# Patient Record
Sex: FEMALE | Race: WHITE | NOT HISPANIC OR LATINO | Employment: PART TIME | URBAN - NONMETROPOLITAN AREA
[De-identification: names, ages, dates, MRNs, and addresses within clinical notes are randomized per-mention and may not be internally consistent; named-entity substitution may affect disease eponyms.]

---

## 2023-04-06 ENCOUNTER — HOSPITAL ENCOUNTER (EMERGENCY)
Facility: HOSPITAL | Age: 43
Discharge: HOME | End: 2023-04-06
Attending: STUDENT IN AN ORGANIZED HEALTH CARE EDUCATION/TRAINING PROGRAM | Admitting: STUDENT IN AN ORGANIZED HEALTH CARE EDUCATION/TRAINING PROGRAM
Payer: COMMERCIAL

## 2023-04-06 ENCOUNTER — APPOINTMENT (OUTPATIENT)
Dept: GENERAL RADIOLOGY | Facility: HOSPITAL | Age: 43
End: 2023-04-06
Payer: COMMERCIAL

## 2023-04-06 ENCOUNTER — APPOINTMENT (OUTPATIENT)
Dept: CT IMAGING | Facility: HOSPITAL | Age: 43
End: 2023-04-06
Payer: COMMERCIAL

## 2023-04-06 VITALS
HEART RATE: 76 BPM | HEIGHT: 66 IN | OXYGEN SATURATION: 97 % | DIASTOLIC BLOOD PRESSURE: 84 MMHG | WEIGHT: 180 LBS | SYSTOLIC BLOOD PRESSURE: 173 MMHG | TEMPERATURE: 97.5 F | RESPIRATION RATE: 22 BRPM | BODY MASS INDEX: 28.93 KG/M2

## 2023-04-06 DIAGNOSIS — R06.00 DYSPNEA, UNSPECIFIED TYPE: ICD-10-CM

## 2023-04-06 DIAGNOSIS — M54.2 NECK PAIN ON LEFT SIDE: Primary | ICD-10-CM

## 2023-04-06 DIAGNOSIS — R91.8 MULTIPLE LUNG NODULES: ICD-10-CM

## 2023-04-06 PROBLEM — Z33.1 PREGNANT STATE, INCIDENTAL: Status: ACTIVE | Noted: 2023-04-06

## 2023-04-06 LAB
ALBUMIN SERPL-MCNC: 4 G/DL (ref 3.5–5.2)
ALBUMIN/GLOB SERPL: 1.6 G/DL
ALP SERPL-CCNC: 99 U/L (ref 39–117)
ALT SERPL W P-5'-P-CCNC: 24 U/L (ref 1–33)
ANION GAP SERPL CALCULATED.3IONS-SCNC: 11 MMOL/L (ref 5–15)
AST SERPL-CCNC: 19 U/L (ref 1–32)
BILIRUB SERPL-MCNC: 0.2 MG/DL (ref 0–1.2)
BILIRUB UR QL STRIP: NEGATIVE
BUN SERPL-MCNC: 9 MG/DL (ref 6–20)
BUN/CREAT SERPL: 8.2 (ref 7–25)
CALCIUM SPEC-SCNC: 8.8 MG/DL (ref 8.6–10.5)
CHLORIDE SERPL-SCNC: 104 MMOL/L (ref 98–107)
CLARITY UR: CLEAR
CO2 SERPL-SCNC: 26 MMOL/L (ref 22–29)
COLOR UR: YELLOW
CREAT SERPL-MCNC: 1.1 MG/DL (ref 0.57–1)
D-DIMER, QUANTITATIVE (MAD,POW, STR): 530 NG/ML (FEU) (ref 0–500)
EGFRCR SERPLBLD CKD-EPI 2021: 64.1 ML/MIN/1.73
GEN 5 2HR TROPONIN T REFLEX: <6 NG/L
GLOBULIN UR ELPH-MCNC: 2.5 GM/DL
GLUCOSE SERPL-MCNC: 89 MG/DL (ref 65–99)
GLUCOSE UR STRIP-MCNC: NEGATIVE MG/DL
HGB UR QL STRIP.AUTO: NEGATIVE
HOLD SPECIMEN: NORMAL
HOLD SPECIMEN: NORMAL
KETONES UR QL STRIP: NEGATIVE
LEUKOCYTE ESTERASE UR QL STRIP.AUTO: NEGATIVE
LIPASE SERPL-CCNC: 40 U/L (ref 13–60)
NITRITE UR QL STRIP: NEGATIVE
NT-PROBNP SERPL-MCNC: 48.7 PG/ML (ref 0–450)
PH UR STRIP.AUTO: 5.5 [PH] (ref 5–9)
POTASSIUM SERPL-SCNC: 3.7 MMOL/L (ref 3.5–5.2)
PROT SERPL-MCNC: 6.5 G/DL (ref 6–8.5)
PROT UR QL STRIP: NEGATIVE
SODIUM SERPL-SCNC: 141 MMOL/L (ref 136–145)
SP GR UR STRIP: 1.03 (ref 1–1.03)
TROPONIN T DELTA: NORMAL
TROPONIN T SERPL HS-MCNC: <6 NG/L
UROBILINOGEN UR QL STRIP: ABNORMAL
WHOLE BLOOD HOLD COAG: NORMAL
WHOLE BLOOD HOLD SPECIMEN: NORMAL

## 2023-04-06 PROCEDURE — 84484 ASSAY OF TROPONIN QUANT: CPT

## 2023-04-06 PROCEDURE — 25010000002 METHYLPREDNISOLONE PER 125 MG: Performed by: NURSE PRACTITIONER

## 2023-04-06 PROCEDURE — 71045 X-RAY EXAM CHEST 1 VIEW: CPT

## 2023-04-06 PROCEDURE — 96375 TX/PRO/DX INJ NEW DRUG ADDON: CPT

## 2023-04-06 PROCEDURE — 85379 FIBRIN DEGRADATION QUANT: CPT | Performed by: NURSE PRACTITIONER

## 2023-04-06 PROCEDURE — 36415 COLL VENOUS BLD VENIPUNCTURE: CPT

## 2023-04-06 PROCEDURE — 83690 ASSAY OF LIPASE: CPT | Performed by: NURSE PRACTITIONER

## 2023-04-06 PROCEDURE — 99284 EMERGENCY DEPT VISIT MOD MDM: CPT

## 2023-04-06 PROCEDURE — 71275 CT ANGIOGRAPHY CHEST: CPT

## 2023-04-06 PROCEDURE — 25510000001 IOPAMIDOL PER 1 ML: Performed by: STUDENT IN AN ORGANIZED HEALTH CARE EDUCATION/TRAINING PROGRAM

## 2023-04-06 PROCEDURE — 80053 COMPREHEN METABOLIC PANEL: CPT

## 2023-04-06 PROCEDURE — 93005 ELECTROCARDIOGRAM TRACING: CPT

## 2023-04-06 PROCEDURE — 25010000002 KETOROLAC TROMETHAMINE PER 15 MG: Performed by: NURSE PRACTITIONER

## 2023-04-06 PROCEDURE — 96374 THER/PROPH/DIAG INJ IV PUSH: CPT

## 2023-04-06 PROCEDURE — 85025 COMPLETE CBC W/AUTO DIFF WBC: CPT

## 2023-04-06 PROCEDURE — 81003 URINALYSIS AUTO W/O SCOPE: CPT | Performed by: NURSE PRACTITIONER

## 2023-04-06 PROCEDURE — 83880 ASSAY OF NATRIURETIC PEPTIDE: CPT

## 2023-04-06 PROCEDURE — 72125 CT NECK SPINE W/O DYE: CPT

## 2023-04-06 RX ORDER — METHYLPREDNISOLONE SODIUM SUCCINATE 125 MG/2ML
125 INJECTION, POWDER, LYOPHILIZED, FOR SOLUTION INTRAMUSCULAR; INTRAVENOUS ONCE
Status: COMPLETED | OUTPATIENT
Start: 2023-04-06 | End: 2023-04-06

## 2023-04-06 RX ORDER — CYCLOBENZAPRINE HCL 5 MG
5 TABLET ORAL ONCE
Status: COMPLETED | OUTPATIENT
Start: 2023-04-06 | End: 2023-04-06

## 2023-04-06 RX ORDER — SODIUM CHLORIDE 0.9 % (FLUSH) 0.9 %
10 SYRINGE (ML) INJECTION AS NEEDED
Status: DISCONTINUED | OUTPATIENT
Start: 2023-04-06 | End: 2023-04-07 | Stop reason: HOSPADM

## 2023-04-06 RX ORDER — CYCLOBENZAPRINE HCL 5 MG
5 TABLET ORAL 3 TIMES DAILY PRN
Qty: 15 TABLET | Refills: 0 | Status: SHIPPED | OUTPATIENT
Start: 2023-04-06

## 2023-04-06 RX ORDER — HYDROCODONE BITARTRATE AND ACETAMINOPHEN 5; 325 MG/1; MG/1
1 TABLET ORAL ONCE
Status: COMPLETED | OUTPATIENT
Start: 2023-04-06 | End: 2023-04-06

## 2023-04-06 RX ORDER — KETOROLAC TROMETHAMINE 30 MG/ML
30 INJECTION, SOLUTION INTRAMUSCULAR; INTRAVENOUS ONCE
Status: COMPLETED | OUTPATIENT
Start: 2023-04-06 | End: 2023-04-06

## 2023-04-06 RX ADMIN — METHYLPREDNISOLONE SODIUM SUCCINATE 125 MG: 125 INJECTION, POWDER, FOR SOLUTION INTRAMUSCULAR; INTRAVENOUS at 21:47

## 2023-04-06 RX ADMIN — CYCLOBENZAPRINE 5 MG: 5 TABLET, FILM COATED ORAL at 18:44

## 2023-04-06 RX ADMIN — KETOROLAC TROMETHAMINE 30 MG: 30 INJECTION, SOLUTION INTRAMUSCULAR; INTRAVENOUS at 18:44

## 2023-04-06 RX ADMIN — SODIUM CHLORIDE 1000 ML: 9 INJECTION, SOLUTION INTRAVENOUS at 18:43

## 2023-04-06 RX ADMIN — IOPAMIDOL 59 ML: 755 INJECTION, SOLUTION INTRAVENOUS at 19:38

## 2023-04-06 RX ADMIN — HYDROCODONE BITARTRATE AND ACETAMINOPHEN 1 TABLET: 5; 325 TABLET ORAL at 21:47

## 2023-04-06 NOTE — Clinical Note
[facility] EMERGENCY DEPARTMENT  [address] [place] [id number]  Phone: [phone]    Roma Lazaro was seen and treated in our emergency department on 4/6/2023.  She may return to work on 04/08/2023.         Thank you for choosing [facility].    Cristal Rebolledo APRN

## 2023-04-07 LAB
BASOPHILS # BLD AUTO: NORMAL 10*3/UL
BASOPHILS NFR BLD AUTO: NORMAL %
DEPRECATED RDW RBC AUTO: NORMAL FL
EOSINOPHIL # BLD AUTO: NORMAL 10*3/UL
EOSINOPHIL NFR BLD AUTO: NORMAL %
ERYTHROCYTE [DISTWIDTH] IN BLOOD BY AUTOMATED COUNT: NORMAL %
HCT VFR BLD AUTO: NORMAL %
HGB BLD-MCNC: NORMAL G/DL
LYMPHOCYTES # BLD AUTO: NORMAL 10*3/UL
LYMPHOCYTES NFR BLD AUTO: NORMAL %
MCH RBC QN AUTO: NORMAL PG
MCHC RBC AUTO-ENTMCNC: NORMAL G/DL
MCV RBC AUTO: NORMAL FL
MONOCYTES # BLD AUTO: NORMAL 10*3/UL
MONOCYTES NFR BLD AUTO: NORMAL %
NEUTROPHILS NFR BLD AUTO: NORMAL %
NEUTROPHILS NFR BLD AUTO: NORMAL %
PLATELET # BLD AUTO: NORMAL 10*3/UL
PMV BLD AUTO: NORMAL FL
RBC # BLD AUTO: NORMAL 10*6/UL
WBC NRBC COR # BLD: NORMAL 10*3/UL

## 2023-04-07 NOTE — ED NOTES
Pt requested that her IV be taken out because she was ready to leave, this tech removed IV and patient eloped.

## 2023-04-07 NOTE — ED PROVIDER NOTES
Subjective   History of Present Illness  Patient presents to the ER with c/o pain to her left neck that radiates down to between her shoulder blades, chest pain midsternal that worsens with breathing, shortness of breath. Pain to chest improves with apply pressure to mid chest. She states moving her arms worsens her pain but she does not have pain to her arm. Medications include ibuprofen at 0900 with minimal improvement of pain. Patient report chronic smokers cough with no change in cough presentation. Denies abdominal pain or N/V/D.         Review of Systems   Constitutional: Negative for chills, fatigue and fever.   HENT: Negative.    Respiratory: Positive for shortness of breath.    Cardiovascular: Positive for chest pain. Negative for palpitations.   Gastrointestinal: Negative for abdominal pain, nausea and vomiting.   Genitourinary: Negative.    Musculoskeletal: Positive for back pain and neck pain.   Skin: Negative.    Neurological: Negative.    Psychiatric/Behavioral: Negative.        Past Medical History:   Diagnosis Date   • Acute bronchitis    • Acute otitis media    • Acute sinusitis     nasal congestion      • Bacterial conjunctivitis    • Cellulitis and abscess of leg    • Conjunctivitis    • Cough    • Pediculosis capitis    • Pregnant state, incidental    • Tobacco dependence syndrome    • Upper respiratory infection    • Wheezing        Allergies   Allergen Reactions   • Other      MRSA HX       Past Surgical History:   Procedure Laterality Date   • INCISION AND DRAINAGE ABSCESS  10/14/2010   • INJECTION OF MEDICATION  01/26/2015    Kenalog (1)          History reviewed. No pertinent family history.    Social History     Socioeconomic History   • Marital status:    Tobacco Use   • Smoking status: Every Day   Substance and Sexual Activity   • Alcohol use: No   • Drug use: Yes     Types: Marijuana     Comment: Smoked this morning approx 1000   • Sexual activity: Yes           Objective    BP  "141/86 (BP Location: Left arm, Patient Position: Sitting)   Pulse 84   Temp 97.5 °F (36.4 °C) (Oral)   Resp 22   Ht 167.6 cm (66\")   Wt 81.6 kg (180 lb)   LMP  (LMP Unknown)   SpO2 95%   BMI 29.05 kg/m²     Physical Exam  Vitals and nursing note reviewed.   Constitutional:       Appearance: She is not ill-appearing.   HENT:      Head: Normocephalic and atraumatic.   Neck:     Cardiovascular:      Rate and Rhythm: Normal rate and regular rhythm.      Heart sounds: Normal heart sounds.   Pulmonary:      Effort: Pulmonary effort is normal.      Breath sounds: No decreased breath sounds.   Chest:      Chest wall: No tenderness.   Abdominal:      General: Bowel sounds are normal.      Palpations: Abdomen is soft.      Tenderness: There is no abdominal tenderness.   Musculoskeletal:         General: Normal range of motion.   Skin:     General: Skin is warm and dry.      Capillary Refill: Capillary refill takes less than 2 seconds.   Neurological:      Mental Status: She is alert and oriented to person, place, and time.   Psychiatric:         Mood and Affect: Mood normal.         Behavior: Behavior normal.         Procedures  Results for orders placed or performed during the hospital encounter of 04/06/23   High Sensitivity Troponin T    Specimen: Blood   Result Value Ref Range    HS Troponin T <6 <10 ng/L   Comprehensive Metabolic Panel    Specimen: Blood   Result Value Ref Range    Glucose 89 65 - 99 mg/dL    BUN 9 6 - 20 mg/dL    Creatinine 1.10 (H) 0.57 - 1.00 mg/dL    Sodium 141 136 - 145 mmol/L    Potassium 3.7 3.5 - 5.2 mmol/L    Chloride 104 98 - 107 mmol/L    CO2 26.0 22.0 - 29.0 mmol/L    Calcium 8.8 8.6 - 10.5 mg/dL    Total Protein 6.5 6.0 - 8.5 g/dL    Albumin 4.0 3.5 - 5.2 g/dL    ALT (SGPT) 24 1 - 33 U/L    AST (SGOT) 19 1 - 32 U/L    Alkaline Phosphatase 99 39 - 117 U/L    Total Bilirubin 0.2 0.0 - 1.2 mg/dL    Globulin 2.5 gm/dL    A/G Ratio 1.6 g/dL    BUN/Creatinine Ratio 8.2 7.0 - 25.0    Anion " Gap 11.0 5.0 - 15.0 mmol/L    eGFR 64.1 >60.0 mL/min/1.73   BNP    Specimen: Blood   Result Value Ref Range    proBNP 48.7 0.0 - 450.0 pg/mL   Lipase    Specimen: Blood   Result Value Ref Range    Lipase 40 13 - 60 U/L   D-dimer, Quantitative    Specimen: Blood   Result Value Ref Range    D-Dimer, Quantitative 530 (H) 0 - 500 ng/mL (FEU)   High Sensitivity Troponin T 2Hr    Specimen: Arm, Left; Blood   Result Value Ref Range    HS Troponin T <6 <10 ng/L    Troponin T Delta     ECG 12 Lead ED Triage Standing Order; Chest Pain   Result Value Ref Range    QT Interval 380 ms    QTC Interval 443 ms   Green Top (Gel)   Result Value Ref Range    Extra Tube Hold for add-ons.    Lavender Top   Result Value Ref Range    Extra Tube hold for add-on    Gold Top - SST   Result Value Ref Range    Extra Tube Hold for add-ons.    Light Blue Top   Result Value Ref Range    Extra Tube Hold for add-ons.      CT Cervical Spine Without Contrast    Result Date: 4/6/2023  Narrative: TECHNIQUE: Axial images from the base of the skull through the thoracic inlet were performed followed by 2D multiplanar reformats. FINDINGS: Straightening of the normal cervical lordosis.  There is no fracture. No malalignment. Severe degenerative disc disease at the C5-6 and C6-7 levels.  Small posterior disc osteophyte complex at these levels without significant narrowing of the bony spinal canal.     Impression: 1.  No acute abnormalities. 2.  Degenerative changes as detailed above.    XR Chest 1 View    Result Date: 4/6/2023  Narrative: Comparison: None FINDINGS: Subtle tree-in-bud nodular opacities at the bilateral lung bases may represent pneumonitis.  No pleural effusion or pneumothorax.  Cardiomediastinal silhouette is unremarkable.     CT Angiogram Chest    Result Date: 4/6/2023  Narrative: TECHNIQUE: IV contrast was administered and axial images from the thoracic inlet through the diaphragms were performed.  3D multiplanar reformats of the thoracic  aorta were completed on a separate workstation under concurrent supervision. FINDINGS: Osseous structures are age-appropriate.  Limited views of the upper abdomen demonstrate diffuse fatty infiltration of the liver. Heart size is within normal limits.  No intrathoracic lymphadenopathy.  No pulmonary embolus. There is moderate centrilobular emphysema.  4 mm pulmonary nodule right upper lobe image 49 series 3.  Small subpleural nodule measuring 3 mm right upper lobe image 61 series 3.  Groundglass opacity right upper lobe measuring 7 mm image 36 series 3.     Impression: 1.  No pulmonary embolus. 2.  Several pulmonary nodules as detailed above in this patient with emphysema. Follow-up CT of the chest in 3-6 months is recommended. 3.  Hepatomegaly and diffuse fatty infiltration of the liver.             ED Course  ED Course as of 04/06/23 2141   Thu Apr 06, 2023 2045 Patient states medication has helped some but not completely resolved the pain in her neck and back.  [SH]   2109 Work up reviewed with patient. Encouraged patient to stop smoking at this time. Aware of need for repeat CT. Will repeat Trop and EKG and if normal discharge home. Will provide pain medication and steroids at this time.  [SH]      ED Course User Index  [SH] Cristal Rebolledo, JULIETTE                                           Medical Decision Making  Patient presents to the ER with c/o left sided neck pain that radiates down in to her back between her shoulder blades. She states the pain radiates into her upper chest and she has pain when breathing. Work up shows negative Trop x2 with NSR on EKG. CTA negative for PE. Pain has improved with use of medications. Discussed D/C home with treatment and advised on when to return back to the ER . Advised to follow up with PCP for reevaluation and repeat CT in 3-6 months.     Dyspnea, unspecified type: complicated acute illness or injury  Multiple lung nodules: complicated acute illness or  injury  Neck pain on left side: complicated acute illness or injury  Amount and/or Complexity of Data Reviewed  Radiology: ordered.  ECG/medicine tests: ordered.      Risk  Prescription drug management.          Final diagnoses:   Neck pain on left side   Multiple lung nodules   Dyspnea, unspecified type       ED Disposition  ED Disposition     ED Disposition   Discharge    Condition   Stable    Comment   --             OMEGA  RESIDENT [facility]  [address]   [place] [address] [phone]  Schedule an appointment as soon as possible for a visit   ER follow up         Medication List      New Prescriptions    cyclobenzaprine 5 MG tablet  Commonly known as: FLEXERIL  Take 1 tablet by mouth 3 (Three) Times a Day As Needed for Muscle Spasms.        Changed    diclofenac 50 MG EC tablet  Commonly known as: VOLTAREN  Take 1 tablet by mouth 3 (Three) Times a Day As Needed (pain).  What changed:   · when to take this  · reasons to take this        Stop    promethazine-dextromethorphan 6.25-15 MG/5ML syrup  Commonly known as: PROMETHAZINE-DM           Where to Get Your Medications      These medications were sent to [facility]/pharmacy #[id number] - [place], [place] - [address] [place] [address] - [phone]  - [phone] [address] [address]    Phone: [phone]   · cyclobenzaprine 5 MG tablet  · diclofenac 50 MG EC tablet          Cristal Rebolledo, JULIETTE  04/06/23 4312

## 2023-04-07 NOTE — DISCHARGE INSTRUCTIONS
Fill your prescriptions and take as directed. Work excuse provided. Follow up with the [facility] or a primary care provider of your choice early next week for reevaluation - call tomorrow for appointment. Return back to the ER over the weekend if your symptoms worsen or change in presentation. You CT scan shows you have emphysema and some small lung nodules in your right lung. Stop smoking. It is recommended you have a repeat CT scan in 3-6 months for reevaluation - this will need to be set up by your PCP.

## 2023-04-13 LAB
QT INTERVAL: 380 MS
QTC INTERVAL: 443 MS

## 2023-05-08 ENCOUNTER — OFFICE VISIT (OUTPATIENT)
Dept: FAMILY MEDICINE CLINIC | Facility: CLINIC | Age: 43
End: 2023-05-08
Payer: COMMERCIAL

## 2023-05-08 VITALS
OXYGEN SATURATION: 99 % | WEIGHT: 190 LBS | DIASTOLIC BLOOD PRESSURE: 78 MMHG | HEART RATE: 98 BPM | SYSTOLIC BLOOD PRESSURE: 134 MMHG | HEIGHT: 66 IN | BODY MASS INDEX: 30.53 KG/M2

## 2023-05-08 DIAGNOSIS — Z11.59 NEED FOR HEPATITIS C SCREENING TEST: ICD-10-CM

## 2023-05-08 DIAGNOSIS — Z13.29 SCREENING FOR THYROID DISORDER: ICD-10-CM

## 2023-05-08 DIAGNOSIS — R23.2 HOT FLASHES: ICD-10-CM

## 2023-05-08 DIAGNOSIS — F17.210 CIGARETTE NICOTINE DEPENDENCE WITHOUT COMPLICATION: ICD-10-CM

## 2023-05-08 DIAGNOSIS — Z76.89 ENCOUNTER TO ESTABLISH CARE: ICD-10-CM

## 2023-05-08 DIAGNOSIS — Z13.220 SCREENING FOR HYPERCHOLESTEROLEMIA: ICD-10-CM

## 2023-05-08 DIAGNOSIS — R06.02 SHORTNESS OF BREATH: Primary | ICD-10-CM

## 2023-05-08 PROCEDURE — 99214 OFFICE O/P EST MOD 30 MIN: CPT | Performed by: NURSE PRACTITIONER

## 2023-05-08 PROCEDURE — 1160F RVW MEDS BY RX/DR IN RCRD: CPT | Performed by: NURSE PRACTITIONER

## 2023-05-08 PROCEDURE — 1159F MED LIST DOCD IN RCRD: CPT | Performed by: NURSE PRACTITIONER

## 2023-05-08 RX ORDER — BUPROPION HYDROCHLORIDE 150 MG/1
150 TABLET ORAL DAILY
Qty: 7 TABLET | Refills: 0 | Status: SHIPPED | OUTPATIENT
Start: 2023-05-08 | End: 2023-05-11 | Stop reason: SDUPTHER

## 2023-05-08 NOTE — PROGRESS NOTES
"Chief Complaint  Establish Care (New PT )    Subjective  Encounter to establish care.  Has been complaining of shortness of breath and neck pain.  Was evaluated at [facility] emergency department on April 26 and chest CT at that time showed multiple pulmonary nodules.  \"I guess I am not in as good shape as I thought I was.  This is really made me scared.  I have also been having hot flashes and night sweats.\"        Roma Lazaro presents to [facility] [place]  Shortness of Breath  This is a chronic problem. The current episode started more than 1 month ago. The problem occurs intermittently. The problem has been waxing and waning. Pertinent negatives include no chest pain or leg swelling. The symptoms are aggravated by smoke. She has tried nothing for the symptoms. The treatment provided no relief.   Night Sweats  This is a chronic problem. The current episode started more than 1 year ago. The problem occurs every several days. The problem has been waxing and waning. Pertinent negatives include no chest pain. Nothing aggravates the symptoms. She has tried nothing for the symptoms. The treatment provided no relief.   Nicotine Dependence  Presents for initial visit. Symptoms include cravings and irritability. Preferred tobacco types include cigarettes. Preferred cigarette types include filtered. Preferred strength is regular. Preferred cigarettes are menthol. Cigarette brand: MonteBioptigen  Her urge triggers include company of smokers, driving and meal time. Her first smoke is before 6 AM. She smokes 1 pack of cigarettes per day. She started smoking when she was 15-17 years old. Past treatments include nothing. Roma is ready to quit. Roma has tried to quit 0 times.     Current Outpatient Medications on File Prior to Visit   Medication Sig Dispense Refill   • [DISCONTINUED] cyclobenzaprine (FLEXERIL) 5 MG tablet Take 1 tablet by mouth " "3 (Three) Times a Day As Needed for Muscle Spasms. 15 tablet 0   • [DISCONTINUED] diclofenac (VOLTAREN) 50 MG EC tablet Take 1 tablet by mouth 3 (Three) Times a Day As Needed (pain). 30 tablet 0     No current facility-administered medications on file prior to visit.     Allergies   Allergen Reactions   • Other      MRSA HX       Objective   Vital Signs:  /78   Pulse 98   Ht 167.6 cm (66\")   Wt 86.2 kg (190 lb)   SpO2 99%   BMI 30.67 kg/m²   Estimated body mass index is 30.67 kg/m² as calculated from the following:    Height as of this encounter: 167.6 cm (66\").    Weight as of this encounter: 86.2 kg (190 lb).       BMI is >= 30 and <35. (Class 1 Obesity). The following options were offered after discussion;: exercise counseling/recommendations and nutrition counseling/recommendations      Physical Exam  Vitals and nursing note reviewed.   Constitutional:       Appearance: Normal appearance. She is well-developed. She is obese.   HENT:      Head: Normocephalic and atraumatic.      Right Ear: Tympanic membrane, ear canal and external ear normal.      Left Ear: Tympanic membrane, ear canal and external ear normal.      Nose: Nose normal.      Mouth/Throat:      Mouth: Mucous membranes are moist.      Pharynx: Oropharynx is clear.   Eyes:      Extraocular Movements: Extraocular movements intact.      Conjunctiva/sclera: Conjunctivae normal.      Pupils: Pupils are equal, round, and reactive to light.   Cardiovascular:      Rate and Rhythm: Normal rate and regular rhythm.      Pulses: Normal pulses.      Heart sounds: Normal heart sounds.   Pulmonary:      Effort: Pulmonary effort is normal.      Breath sounds: Normal breath sounds.   Abdominal:      General: Bowel sounds are normal.      Palpations: Abdomen is soft.   Musculoskeletal:         General: Normal range of motion.      Cervical back: Normal range of motion and neck supple.   Skin:     General: Skin is warm.      Capillary Refill: Capillary refill " takes less than 2 seconds.   Neurological:      Mental Status: She is alert and oriented to person, place, and time.   Psychiatric:         Behavior: Behavior normal.        Result Review :                   Assessment and Plan   Diagnoses and all orders for this visit:    1. Shortness of breath (Primary)  -     CBC & Differential; Future  -     Comprehensive Metabolic Panel; Future  -     Full Pulmonary Function Test With Bronchodilator; Future    2. Hot flashes  -     Estradiol; Future  -     Testosterone; Future    3. Cigarette nicotine dependence without complication  -     buPROPion XL (Wellbutrin XL) 150 MG 24 hr tablet; Take 1 tablet by mouth Daily.  Dispense: 7 tablet; Refill: 0    4. Screening for hypercholesterolemia  -     Lipid panel; Future    5. Screening for thyroid disorder  -     TSH; Future    6. Need for hepatitis C screening test  -     Hepatitis C antibody; Future    7. Encounter to establish care      1.  Shortness of breath:  Complete CBC and chemistry panel as ordered and will notify of results when available  Pulmonology will call to schedule PFTs and will notify of results when available  Discussed results of most recent chest CT with patient and informed we will repeat chest CT at next appointment in 3 months  Strongly encourage smoking cessation    2.  Hot flashes:  Complete estradiol testosterone level as ordered and will notify of results when available    3.  Cigarette nicotine dependence without complication:  Roma Lazaro  reports that she has been smoking. She does not have any smokeless tobacco history on file.. I have educated her on the risk of diseases from using tobacco products such as cancer, COPD and heart disease.   I advised her to quit and she is willing to quit. We have discussed the following method/s for tobacco cessation:  Prescription Medicaiton.  Together we have set a quit date for 2 weeks from today.  She will follow up with me in 3 months or sooner to  check on her progress.  I spent 4.5 minutes counseling the patient.  Begin Wellbutrin as prescribed  Educated on possible side effects of this medication including not limited to increased risk for weight changes and worsening of depression    4.  Screening for hypercholesterolemia:  Complete fasting lipid panel as ordered will notify of results when available     5   Screening for thyroid disorder:  Complete TSH as ordered and will notify of results when available    6.  Need for hepatitis C screening test:  Complete hepatitis C antibody as ordered and will notify of results when available    7.  Encounter to establish care:       Follow Up   Return in about 3 months (around 8/8/2023) for Annual physical.  Patient was given instructions and counseling regarding her condition or for health maintenance advice. Please see specific information pulled into the AVS if appropriate.         This document has been electronically signed by JULIETTE Steele on May 8, 2023 13:06 CDT

## 2023-05-11 DIAGNOSIS — F17.210 CIGARETTE NICOTINE DEPENDENCE WITHOUT COMPLICATION: ICD-10-CM

## 2023-05-11 RX ORDER — BUPROPION HYDROCHLORIDE 150 MG/1
150 TABLET ORAL DAILY
Qty: 30 TABLET | Refills: 2 | Status: SHIPPED | OUTPATIENT
Start: 2023-05-11
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
## 2023-07-18 PROBLEM — M25.511 ACUTE PAIN OF RIGHT SHOULDER: Status: ACTIVE | Noted: 2023-07-18

## 2023-07-18 PROBLEM — M75.101 ROTATOR CUFF SYNDROME OF RIGHT SHOULDER: Status: ACTIVE | Noted: 2023-07-18

## 2023-08-02 ENCOUNTER — HOSPITAL ENCOUNTER (OUTPATIENT)
Dept: MRI IMAGING | Facility: HOSPITAL | Age: 43
Discharge: HOME | End: 2023-08-02
Admitting: ORTHOPAEDIC SURGERY
Payer: COMMERCIAL

## 2023-08-02 DIAGNOSIS — M25.511 ACUTE PAIN OF RIGHT SHOULDER: ICD-10-CM
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
## 2023-08-02 DIAGNOSIS — M75.101 ROTATOR CUFF SYNDROME OF RIGHT SHOULDER: ICD-10-CM

## 2023-08-02 PROCEDURE — 73221 MRI JOINT UPR EXTREM W/O DYE: CPT

## 2023-08-07 ENCOUNTER — LAB (OUTPATIENT)
Dept: LAB | Facility: HOSPITAL | Age: 43
End: 2023-08-07
Payer: COMMERCIAL

## 2023-08-07 DIAGNOSIS — Z13.29 SCREENING FOR THYROID DISORDER: ICD-10-CM

## 2023-08-07 DIAGNOSIS — R23.2 HOT FLASHES: ICD-10-CM

## 2023-08-07 DIAGNOSIS — Z13.220 SCREENING FOR HYPERCHOLESTEROLEMIA: ICD-10-CM

## 2023-08-07 DIAGNOSIS — Z11.59 NEED FOR HEPATITIS C SCREENING TEST: ICD-10-CM

## 2023-08-07 DIAGNOSIS — R06.02 SHORTNESS OF BREATH: ICD-10-CM

## 2023-08-07 LAB
ALBUMIN SERPL-MCNC: 4.1 G/DL (ref 3.5–5.2)
ALBUMIN/GLOB SERPL: 1.6 G/DL
ALP SERPL-CCNC: 95 U/L (ref 39–117)
ALT SERPL W P-5'-P-CCNC: 27 U/L (ref 1–33)
ANION GAP SERPL CALCULATED.3IONS-SCNC: 10.2 MMOL/L (ref 5–15)
AST SERPL-CCNC: 26 U/L (ref 1–32)
BASOPHILS # BLD AUTO: 0.08 10*3/MM3 (ref 0–0.2)
BASOPHILS NFR BLD AUTO: 0.9 % (ref 0–1.5)
BILIRUB SERPL-MCNC: 0.3 MG/DL (ref 0–1.2)
BUN SERPL-MCNC: 11 MG/DL (ref 6–20)
BUN/CREAT SERPL: 12.2 (ref 7–25)
CALCIUM SPEC-SCNC: 9.7 MG/DL (ref 8.6–10.5)
CHLORIDE SERPL-SCNC: 106 MMOL/L (ref 98–107)
CHOLEST SERPL-MCNC: 96 MG/DL (ref 0–200)
CO2 SERPL-SCNC: 24.8 MMOL/L (ref 22–29)
CREAT SERPL-MCNC: 0.9 MG/DL (ref 0.57–1)
DEPRECATED RDW RBC AUTO: 39.4 FL (ref 37–54)
EGFRCR SERPLBLD CKD-EPI 2021: 81.5 ML/MIN/1.73
EOSINOPHIL # BLD AUTO: 0.28 10*3/MM3 (ref 0–0.4)
EOSINOPHIL NFR BLD AUTO: 3.2 % (ref 0.3–6.2)
ERYTHROCYTE [DISTWIDTH] IN BLOOD BY AUTOMATED COUNT: 13.6 % (ref 12.3–15.4)
ESTRADIOL SERPL HS-MCNC: 15 PG/ML
GLOBULIN UR ELPH-MCNC: 2.5 GM/DL
GLUCOSE SERPL-MCNC: 98 MG/DL (ref 65–99)
HCT VFR BLD AUTO: 40.9 % (ref 34–46.6)
HCV AB SER DONR QL: NORMAL
HDLC SERPL-MCNC: 49 MG/DL (ref 40–60)
HGB BLD-MCNC: 14 G/DL (ref 12–15.9)
IMM GRANULOCYTES # BLD AUTO: 0.04 10*3/MM3 (ref 0–0.05)
IMM GRANULOCYTES NFR BLD AUTO: 0.5 % (ref 0–0.5)
LDLC SERPL CALC-MCNC: 35 MG/DL (ref 0–100)
LDLC/HDLC SERPL: 0.76 {RATIO}
LYMPHOCYTES # BLD AUTO: 2.62 10*3/MM3 (ref 0.7–3.1)
LYMPHOCYTES NFR BLD AUTO: 30.1 % (ref 19.6–45.3)
MCH RBC QN AUTO: 27.9 PG (ref 26.6–33)
MCHC RBC AUTO-ENTMCNC: 34.2 G/DL (ref 31.5–35.7)
MCV RBC AUTO: 81.6 FL (ref 79–97)
MONOCYTES # BLD AUTO: 0.7 10*3/MM3 (ref 0.1–0.9)
MONOCYTES NFR BLD AUTO: 8 % (ref 5–12)
NEUTROPHILS NFR BLD AUTO: 4.98 10*3/MM3 (ref 1.7–7)
NEUTROPHILS NFR BLD AUTO: 57.3 % (ref 42.7–76)
NRBC BLD AUTO-RTO: 0 /100 WBC (ref 0–0.2)
PLATELET # BLD AUTO: 288 10*3/MM3 (ref 140–450)
PMV BLD AUTO: 10.5 FL (ref 6–12)
POTASSIUM SERPL-SCNC: 5.1 MMOL/L (ref 3.5–5.2)
PROT SERPL-MCNC: 6.6 G/DL (ref 6–8.5)
RBC # BLD AUTO: 5.01 10*6/MM3 (ref 3.77–5.28)
SODIUM SERPL-SCNC: 141 MMOL/L (ref 136–145)
TESTOST SERPL-MCNC: 10.9 NG/DL (ref 8.4–48.1)
TRIGL SERPL-MCNC: 50 MG/DL (ref 0–150)
TSH SERPL DL<=0.05 MIU/L-ACNC: 1.7 UIU/ML (ref 0.27–4.2)
VLDLC SERPL-MCNC: 12 MG/DL (ref 5–40)
WBC NRBC COR # BLD: 8.7 10*3/MM3 (ref 3.4–10.8)

## 2023-08-07 PROCEDURE — 84443 ASSAY THYROID STIM HORMONE: CPT

## 2023-08-07 PROCEDURE — 36415 COLL VENOUS BLD VENIPUNCTURE: CPT

## 2023-08-07 PROCEDURE — 80061 LIPID PANEL: CPT

## 2023-08-07 PROCEDURE — 80053 COMPREHEN METABOLIC PANEL: CPT

## 2023-08-07 PROCEDURE — 85025 COMPLETE CBC W/AUTO DIFF WBC: CPT

## 2023-08-07 PROCEDURE — 82670 ASSAY OF TOTAL ESTRADIOL: CPT

## 2023-08-07 PROCEDURE — 84403 ASSAY OF TOTAL TESTOSTERONE: CPT

## 2023-08-07 PROCEDURE — 86803 HEPATITIS C AB TEST: CPT

## 2023-08-08 ENCOUNTER — TELEPHONE (OUTPATIENT)
Dept: FAMILY MEDICINE CLINIC | Facility: CLINIC | Age: 43
End: 2023-08-08
Payer: COMMERCIAL

## 2023-08-08 NOTE — PROGRESS NOTES
Per JULIETTE Espinosa, Ms. Lazaro has been called with recent lab results & recommendations.  Continue current medications and follow-up as planned or sooner if any problems.

## 2023-08-08 NOTE — TELEPHONE ENCOUNTER
Per JULIETTE Espinosa, Ms. Lazaro has been called with recent lab results & recommendations.  Continue current medications and follow-up as planned or sooner if any problems.     ----- Message from JULIETTE Steele sent at 8/8/2023  6:33 AM CDT -----  All labs including hormone levels were essentially normal.

## 2023-08-10 ENCOUNTER — OFFICE VISIT (OUTPATIENT)
Dept: FAMILY MEDICINE CLINIC | Facility: CLINIC | Age: 43
End: 2023-08-10
Payer: COMMERCIAL

## 2023-08-10 VITALS
HEIGHT: 66 IN | OXYGEN SATURATION: 96 % | HEART RATE: 90 BPM | DIASTOLIC BLOOD PRESSURE: 78 MMHG | SYSTOLIC BLOOD PRESSURE: 126 MMHG | WEIGHT: 195.9 LBS | BODY MASS INDEX: 31.48 KG/M2

## 2023-08-10 DIAGNOSIS — Z23 NEED FOR TDAP VACCINATION: ICD-10-CM

## 2023-08-10 DIAGNOSIS — S46.011D TRAUMATIC COMPLETE TEAR OF RIGHT ROTATOR CUFF, SUBSEQUENT ENCOUNTER: ICD-10-CM

## 2023-08-10 DIAGNOSIS — F41.9 ANXIETY: ICD-10-CM

## 2023-08-10 DIAGNOSIS — Z12.31 SCREENING MAMMOGRAM FOR BREAST CANCER: ICD-10-CM

## 2023-08-10 DIAGNOSIS — Z00.00 ANNUAL PHYSICAL EXAM: Primary | ICD-10-CM

## 2023-08-10 DIAGNOSIS — Z12.4 ENCOUNTER FOR SCREENING FOR CERVICAL CANCER: ICD-10-CM

## 2023-08-10 DIAGNOSIS — Z11.51 ENCOUNTER FOR SCREENING FOR HUMAN PAPILLOMAVIRUS (HPV): ICD-10-CM

## 2023-08-10 RX ORDER — BUPROPION HYDROCHLORIDE 300 MG/1
300 TABLET ORAL DAILY
Qty: 90 TABLET | Refills: 3 | Status: SHIPPED | OUTPATIENT
Start: 2023-08-10

## 2023-08-10 NOTE — PROGRESS NOTES
"Chief Complaint  Annual Exam    Subjective        Roma Lazaro presents to [facility] [place]  Annual physical exam.  On June 22, 2023 was evaluated at [facility] emergency department with right shoulder pain after falling at home.  Had MRI which showed massive rotator cuff tear.  Scheduled to follow-up with Dr. Hernandez, orthopedist, on August 15, 2023 to discuss possible surgical intervention.  Anxiety has been increasing \"because I never had surgery.  I really do not want to be cut on.\"    History of Present Illness  HPI:  Annual physical exam   Shoulder Injury   The incident occurred at home. The right shoulder is affected. The injury mechanism was a fall. The quality of the pain is described as aching and shooting. The pain radiates to the right arm. The pain is severe. The symptoms are aggravated by overhead lifting and movement. The treatment provided mild relief.   Anxiety  Presents for follow-up visit. The problem has been gradually worsening. Symptoms include decreased concentration, excessive worry, nervous/anxious behavior, panic and restlessness. Symptoms occur constantly. The severity of symptoms is moderate. The quality of sleep is fair. Nighttime awakenings: occasional.     Compliance with medications is %.     Current Outpatient Medications on File Prior to Visit   Medication Sig Dispense Refill    meloxicam (MOBIC) 15 MG tablet 1 PO Daily with food. 30 tablet 3    [DISCONTINUED] buPROPion XL (Wellbutrin XL) 150 MG 24 hr tablet Take 1 tablet by mouth Daily. 30 tablet 2     No current facility-administered medications on file prior to visit.     Allergies   Allergen Reactions    Other      MRSA HX       Objective   Vital Signs:  /78   Pulse 90   Ht 167.6 cm (66\")   Wt 88.9 kg (195 lb 14.4 oz)   SpO2 96%   BMI 31.62 kg/mý   Estimated body mass index is 31.62 kg/mý as calculated from the following:    " "Height as of this encounter: 167.6 cm (66\").    Weight as of this encounter: 88.9 kg (195 lb 14.4 oz).       Physical Exam  Vitals and nursing note reviewed. Exam conducted with a chaperone present.   Constitutional:       Appearance: Normal appearance. She is well-developed. She is obese.   HENT:      Head: Normocephalic and atraumatic.      Right Ear: Tympanic membrane, ear canal and external ear normal.      Left Ear: Tympanic membrane, ear canal and external ear normal.      Nose: Nose normal.      Mouth/Throat:      Mouth: Mucous membranes are moist.      Pharynx: Oropharynx is clear.   Eyes:      Extraocular Movements: Extraocular movements intact.      Conjunctiva/sclera: Conjunctivae normal.      Pupils: Pupils are equal, round, and reactive to light.   Cardiovascular:      Rate and Rhythm: Normal rate and regular rhythm.      Pulses: Normal pulses.      Heart sounds: Normal heart sounds.   Pulmonary:      Effort: Pulmonary effort is normal.      Breath sounds: Normal breath sounds.   Chest:      Comments: Bilateral, manual breast exam performed and no dimpling, puckering, masses or nodules palpated      Abdominal:      General: Bowel sounds are normal.      Palpations: Abdomen is soft.   Genitourinary:     General: Normal vulva.      Labia:         Right: No rash, tenderness, lesion or injury.         Left: No rash, tenderness, lesion or injury.       Urethra: No prolapse, urethral pain, urethral swelling or urethral lesion.   Musculoskeletal:         General: Normal range of motion.      Cervical back: Normal range of motion and neck supple.   Skin:     General: Skin is warm.      Capillary Refill: Capillary refill takes less than 2 seconds.   Neurological:      Mental Status: She is alert and oriented to person, place, and time.   Psychiatric:         Behavior: Behavior normal.      Result Review :                   Assessment and Plan   Diagnoses and all orders for this visit:    1. Annual physical exam " (Primary)    2. Traumatic complete tear of right rotator cuff, subsequent encounter    3. Anxiety  -     buPROPion XL (Wellbutrin XL) 300 MG 24 hr tablet; Take 1 tablet by mouth Daily.  Dispense: 90 tablet; Refill: 3    4. Need for Tdap vaccination  -     Tdap Vaccine Greater Than or Equal To 8yo IM    5. Screening mammogram for breast cancer  -     Mammo Screening Digital Tomosynthesis Bilateral With CAD; Future    6. Encounter for screening for human papillomavirus (HPV)  -     HPV GENOTYPING, [facility]) - ThinPrep Vial, Cervix; Future  -     HPV GENOTYPING, [facility]) - ThinPrep Vial, Cervix    7. Encounter for screening for cervical cancer  -     LIQUID-BASED PAP SMEAR, [facility] ([facility]); Future  -     LIQUID-BASED PAP SMEAR, [facility] ([facility])      1.  Annual physical exam:  Continue on current medications as previously prescribed   Counseling on importance of heathy eating habits and regular physical activity regimen on improving overall physical and mental health.     2.  Traumatic complete tear of right rotator cuff, subsequent encounter:  Continue orthopedic follow-up with Dr. Hernandez as scheduled  Continue meloxicam as prescribed  Continue use of sling to help immobilize shoulder    3.  Anxiety:  Increase Wellbutrin from 150 mg p.o. daily to 300 mg p.o. daily and new prescription sent to pharmacy  Continue stress reducing techniques such as deep breathing and guided imagery    4.  Need for Tdap vaccination:  Tdap vaccine given IM in office  Educated on possible side effects of vaccine therapy including but not limited to, swelling and redness of injection site  Educated that vaccine therapy will be good for 10 years    5.  Screening mammogram for breast cancer:  Radiology will call to schedule bilateral mammogram  Encouraged monthly self breast exams at home    6.  Encounter for screening for human papilloma virus:  HPV genotyping collected and sent to lab and will notify of  results when available    7.  Encounter for screening for cervical cancer:  ThinPrep Pap smear collected and sent to lab and will notify of results when available       Follow Up   Return in about 1 year (around 8/10/2024) for Annual physical.  Patient was given instructions and counseling regarding her condition or for health maintenance advice. Please see specific information pulled into the AVS if appropriate.         This document has been electronically signed by JULIETTE Steele on August 10, 2023 09:30 CDT

## 2023-08-15 ENCOUNTER — OFFICE VISIT (OUTPATIENT)
Dept: ORTHOPEDIC SURGERY | Facility: CLINIC | Age: 43
End: 2023-08-15
Payer: COMMERCIAL

## 2023-08-15 VITALS — WEIGHT: 195 LBS | BODY MASS INDEX: 31.34 KG/M2 | HEIGHT: 66 IN

## 2023-08-15 DIAGNOSIS — S46.012D TRAUMATIC COMPLETE TEAR OF LEFT ROTATOR CUFF, SUBSEQUENT ENCOUNTER: Primary | ICD-10-CM

## 2023-08-15 DIAGNOSIS — M75.101 ROTATOR CUFF SYNDROME OF RIGHT SHOULDER: ICD-10-CM

## 2023-08-15 DIAGNOSIS — M25.511 ACUTE PAIN OF RIGHT SHOULDER: ICD-10-CM

## 2023-08-15 LAB — REF LAB TEST METHOD: NORMAL

## 2023-08-15 NOTE — PROGRESS NOTES
Roma Lazaro is a 42 y.o. female returns for     Chief Complaint   Patient presents with    Right Shoulder - Follow-up       HISTORY OF PRESENT ILLNESS:  Discuss MRI results and possible shoulder arthroscopy.  Patient continues having pain in her right shoulder that is significant with any activity.  She has pain at night keeps her awake and wakes her up from sleep.  She has difficulty with anything at or above shoulder level.  No numbness or tingling.  She is unhappy with her quality of life and wishes to discuss definitive treatment options.     CONCURRENT MEDICAL HISTORY:    Past Medical History:   Diagnosis Date    Acute bronchitis     Acute otitis media     Acute sinusitis     nasal congestion       Bacterial conjunctivitis     Cellulitis and abscess of leg     Complete rupture of rotator cuff     Conjunctivitis     Cough     Pediculosis capitis     Pregnant state, incidental     Tobacco dependence syndrome     Upper respiratory infection     Wheezing        Allergies   Allergen Reactions    Other      MRSA HX       Current Outpatient Medications on File Prior to Visit   Medication Sig    buPROPion XL (Wellbutrin XL) 300 MG 24 hr tablet Take 1 tablet by mouth Daily.    meloxicam (MOBIC) 15 MG tablet 1 PO Daily with food.     No current facility-administered medications on file prior to visit.       Past Surgical History:   Procedure Laterality Date     SECTION       SECTION  2016    INCISION AND DRAINAGE ABSCESS  10/14/2010    INJECTION OF MEDICATION  2015    Kenalog (1)          History reviewed. No pertinent family history.    Social History     Socioeconomic History    Marital status:    Tobacco Use    Smoking status: Every Day   Substance and Sexual Activity    Alcohol use: No    Drug use: Yes     Types: Marijuana    Sexual activity: Yes           ROS  No fevers or chills.  No chest pain or shortness of air.  No GI or  disturbances.  Other than right shoulder pain,  "all other systems reviewed as negative.    PHYSICAL EXAMINATION:       Ht 167.6 cm (66\")   Wt 88.5 kg (195 lb)   BMI 31.47 kg/mý     Physical Exam  Vitals reviewed.   Constitutional:       General: She is not in acute distress.     Appearance: Normal appearance. She is well-developed.   Cardiovascular:      Rate and Rhythm: Normal rate and regular rhythm.      Heart sounds: Normal heart sounds.   Pulmonary:      Effort: Pulmonary effort is normal.      Breath sounds: Normal breath sounds.   Abdominal:      General: Bowel sounds are normal.      Palpations: Abdomen is soft.   Neurological:      Mental Status: She is alert and oriented to person, place, and time.   Psychiatric:         Behavior: Behavior normal.         Thought Content: Thought content normal.         Judgment: Judgment normal.       GAIT:     [x]  Normal  []  Antalgic    Assistive device: [x]  None  []  Walker     []  Crutches  []  Cane     []  Wheelchair  []  Stretcher    Right Shoulder Exam     Comments:  Shoulder abduction 60 degrees(90 with assistance).  Shoulder flexion 80 degrees (110 with assist).  Positive Haji Elvis test.  3/5 strength.  Good stability on exam.  Positive drop arm test.  Positive empty can test.            XR Shoulder 2+ View Right    Result Date: 7/18/2023  Narrative: Ordering Provider:  Kilo Hernandez MD Ordering Diagnosis/Indication:  Right shoulder pain, unspecified chronicity Procedure:  XR SHOULDER 2+ VW RIGHT Exam Date:  7/18/23 COMPARISON:  Not applicable, no relevant images available.     Impression:  3 views of the right shoulder show acceptable position and alignment with no evidence of acute bony abnormality.  No fracture or dislocation is noted.  There is mild proximal migration of the humeral head in the glenohumeral joint.  Mild to moderate arthritic changes noted in the AC joint.  No acute findings. Kilo Hernandez MD 7/18/23    MRI Shoulder Right Without Contrast    Result Date: " 8/2/2023  Narrative: INDICATION: pain. COMPARISON: None relevant. TECHNIQUE: Multisequence, multiplanar MRI of the right shoulder was performed without intravenous contrast. FINDINGS: Full-thickness tears of supraspinatus and infraspinatus tendons with failure at the footprint and retraction to the level of the glenohumeral joint and beyond. Edema throughout the supraspinatus and infraspinatus muscles with no significant muscle atrophy.  Mild tendinosis of of subscapularis and mild atrophy of the muscle. Glenohumeral joint with minimal osteoarthritis in moderate joint effusion.  Long head biceps tendon intact. Mild acromioclavicular joint osteoarthritis.     Impression: Massive rotator cuff tear involving supraspinatus and infraspinatus, likely recent given edema within the muscle. Mild tendinosis of subscapularis and mild atrophy of the muscle. Minimal glenohumeral joint osteoarthritis with moderate joint effusion. Mild acromioclavicular joint osteoarthritis.            ASSESSMENT:    Diagnoses and all orders for this visit:    Traumatic complete tear of left rotator cuff, subsequent encounter  -     Case Request; Standing  -     ethyl alcohol 62 % 2 each  -     ceFAZolin (ANCEF) 2 g in sodium chloride 0.9 % 100 mL IVPB  -     Case Request    Acute pain of right shoulder  -     Case Request; Standing  -     ethyl alcohol 62 % 2 each  -     ceFAZolin (ANCEF) 2 g in sodium chloride 0.9 % 100 mL IVPB  -     Case Request    Rotator cuff syndrome of right shoulder  -     Case Request; Standing  -     ethyl alcohol 62 % 2 each  -     ceFAZolin (ANCEF) 2 g in sodium chloride 0.9 % 100 mL IVPB  -     Case Request    Other orders  -     Follow Anesthesia Guidelines / Protocol; Future  -     Follow Anesthesia Guidelines / Protocol; Standing  -     Verify NPO Status; Standing  -     POC Glucose Once; Standing  -     Clip operative site; Standing  -     Obtain informed consent (if not collected inpatient or PAT); Standing  -      Provide instructions to patient regarding NPO status  -     Nerve Block; Standing          PLAN    The MRI and results were reviewed with the patient.  She has complete tear of the supraspinatus and infraspinatus with retraction of the tendon.  She has proximal migration of the humeral head as well.  She has a specific injury approximately 8 weeks ago in which she fell in the yard and had onset of symptoms at that time.  The MRI shows intact muscle belly.  She does have tearing of supraspinatus and infraspinatus with retraction but the tissue quality appears to be intact.  She wishes to remain active and wants to proceed with definitive treatment options.  She is unhappy with her quality of life.    The patient voiced understanding of the risks, benefits, and alternative forms of treatment that were discussed and the patient consents to proceed with surgery.  All risks, benefits and alternatives were discussed.  Risks include, but not exclusive to anesthetic complications, including death, MI, CVA, infection, bleeding DVT, fracture, residual pain and need for future surgery.    This discussion was held with the patient by Kilo Hernandez MD and all questions were answered.    Plan arthroscopy of the right shoulder with subacromial decompression, Ignacio procedure, and rotator cuff repair.        Return for Post-operative eval.    Kilo Hernandez MD

## 2023-08-15 NOTE — H&P (VIEW-ONLY)
Roma Lazaro is a 42 y.o. female returns for     Chief Complaint   Patient presents with    Right Shoulder - Follow-up       HISTORY OF PRESENT ILLNESS:  Discuss MRI results and possible shoulder arthroscopy.  Patient continues having pain in her right shoulder that is significant with any activity.  She has pain at night keeps her awake and wakes her up from sleep.  She has difficulty with anything at or above shoulder level.  No numbness or tingling.  She is unhappy with her quality of life and wishes to discuss definitive treatment options.     CONCURRENT MEDICAL HISTORY:    Past Medical History:   Diagnosis Date    Acute bronchitis     Acute otitis media     Acute sinusitis     nasal congestion       Bacterial conjunctivitis     Cellulitis and abscess of leg     Complete rupture of rotator cuff     Conjunctivitis     Cough     Pediculosis capitis     Pregnant state, incidental     Tobacco dependence syndrome     Upper respiratory infection     Wheezing        Allergies   Allergen Reactions    Other      MRSA HX       Current Outpatient Medications on File Prior to Visit   Medication Sig    buPROPion XL (Wellbutrin XL) 300 MG 24 hr tablet Take 1 tablet by mouth Daily.    meloxicam (MOBIC) 15 MG tablet 1 PO Daily with food.     No current facility-administered medications on file prior to visit.       Past Surgical History:   Procedure Laterality Date     SECTION       SECTION  2016    INCISION AND DRAINAGE ABSCESS  10/14/2010    INJECTION OF MEDICATION  2015    Kenalog (1)          History reviewed. No pertinent family history.    Social History     Socioeconomic History    Marital status:    Tobacco Use    Smoking status: Every Day   Substance and Sexual Activity    Alcohol use: No    Drug use: Yes     Types: Marijuana    Sexual activity: Yes           ROS  No fevers or chills.  No chest pain or shortness of air.  No GI or  disturbances.  Other than right shoulder pain,  "all other systems reviewed as negative.    PHYSICAL EXAMINATION:       Ht 167.6 cm (66\")   Wt 88.5 kg (195 lb)   BMI 31.47 kg/m²     Physical Exam  Vitals reviewed.   Constitutional:       General: She is not in acute distress.     Appearance: Normal appearance. She is well-developed.   Cardiovascular:      Rate and Rhythm: Normal rate and regular rhythm.      Heart sounds: Normal heart sounds.   Pulmonary:      Effort: Pulmonary effort is normal.      Breath sounds: Normal breath sounds.   Abdominal:      General: Bowel sounds are normal.      Palpations: Abdomen is soft.   Neurological:      Mental Status: She is alert and oriented to person, place, and time.   Psychiatric:         Behavior: Behavior normal.         Thought Content: Thought content normal.         Judgment: Judgment normal.       GAIT:     [x]  Normal  []  Antalgic    Assistive device: [x]  None  []  Walker     []  Crutches  []  Cane     []  Wheelchair  []  Stretcher    Right Shoulder Exam     Comments:  Shoulder abduction 60 degrees(90 with assistance).  Shoulder flexion 80 degrees (110 with assist).  Positive Haji Elvis test.  3/5 strength.  Good stability on exam.  Positive drop arm test.  Positive empty can test.            XR Shoulder 2+ View Right    Result Date: 7/18/2023  Narrative: Ordering Provider:  Kilo Hernandez MD Ordering Diagnosis/Indication:  Right shoulder pain, unspecified chronicity Procedure:  XR SHOULDER 2+ VW RIGHT Exam Date:  7/18/23 COMPARISON:  Not applicable, no relevant images available.     Impression:  3 views of the right shoulder show acceptable position and alignment with no evidence of acute bony abnormality.  No fracture or dislocation is noted.  There is mild proximal migration of the humeral head in the glenohumeral joint.  Mild to moderate arthritic changes noted in the AC joint.  No acute findings. Kilo Hernandez MD 7/18/23    MRI Shoulder Right Without Contrast    Result Date: " 8/2/2023  Narrative: INDICATION: pain. COMPARISON: None relevant. TECHNIQUE: Multisequence, multiplanar MRI of the right shoulder was performed without intravenous contrast. FINDINGS: Full-thickness tears of supraspinatus and infraspinatus tendons with failure at the footprint and retraction to the level of the glenohumeral joint and beyond. Edema throughout the supraspinatus and infraspinatus muscles with no significant muscle atrophy.  Mild tendinosis of of subscapularis and mild atrophy of the muscle. Glenohumeral joint with minimal osteoarthritis in moderate joint effusion.  Long head biceps tendon intact. Mild acromioclavicular joint osteoarthritis.     Impression: Massive rotator cuff tear involving supraspinatus and infraspinatus, likely recent given edema within the muscle. Mild tendinosis of subscapularis and mild atrophy of the muscle. Minimal glenohumeral joint osteoarthritis with moderate joint effusion. Mild acromioclavicular joint osteoarthritis.            ASSESSMENT:    Diagnoses and all orders for this visit:    Traumatic complete tear of left rotator cuff, subsequent encounter  -     Case Request; Standing  -     ethyl alcohol 62 % 2 each  -     ceFAZolin (ANCEF) 2 g in sodium chloride 0.9 % 100 mL IVPB  -     Case Request    Acute pain of right shoulder  -     Case Request; Standing  -     ethyl alcohol 62 % 2 each  -     ceFAZolin (ANCEF) 2 g in sodium chloride 0.9 % 100 mL IVPB  -     Case Request    Rotator cuff syndrome of right shoulder  -     Case Request; Standing  -     ethyl alcohol 62 % 2 each  -     ceFAZolin (ANCEF) 2 g in sodium chloride 0.9 % 100 mL IVPB  -     Case Request    Other orders  -     Follow Anesthesia Guidelines / Protocol; Future  -     Follow Anesthesia Guidelines / Protocol; Standing  -     Verify NPO Status; Standing  -     POC Glucose Once; Standing  -     Clip operative site; Standing  -     Obtain informed consent (if not collected inpatient or PAT); Standing  -      Provide instructions to patient regarding NPO status  -     Nerve Block; Standing          PLAN    The MRI and results were reviewed with the patient.  She has complete tear of the supraspinatus and infraspinatus with retraction of the tendon.  She has proximal migration of the humeral head as well.  She has a specific injury approximately 8 weeks ago in which she fell in the yard and had onset of symptoms at that time.  The MRI shows intact muscle belly.  She does have tearing of supraspinatus and infraspinatus with retraction but the tissue quality appears to be intact.  She wishes to remain active and wants to proceed with definitive treatment options.  She is unhappy with her quality of life.    The patient voiced understanding of the risks, benefits, and alternative forms of treatment that were discussed and the patient consents to proceed with surgery.  All risks, benefits and alternatives were discussed.  Risks include, but not exclusive to anesthetic complications, including death, MI, CVA, infection, bleeding DVT, fracture, residual pain and need for future surgery.    This discussion was held with the patient by Kilo Hernandez MD and all questions were answered.    Plan arthroscopy of the right shoulder with subacromial decompression, Ignacio procedure, and rotator cuff repair.        Return for Post-operative eval.    Kilo Hernandez MD

## 2023-08-17 ENCOUNTER — TELEPHONE (OUTPATIENT)
Dept: FAMILY MEDICINE CLINIC | Facility: CLINIC | Age: 43
End: 2023-08-17
Payer: COMMERCIAL

## 2023-08-17 PROBLEM — S46.012A TRAUMATIC COMPLETE TEAR OF LEFT ROTATOR CUFF: Status: ACTIVE | Noted: 2023-08-17

## 2023-08-17 NOTE — TELEPHONE ENCOUNTER
-Per JULIETTE Espinosa, Ms. Lazaro has been called with recent pap Smear & HPV results & recommendations.  Continue current medications and follow-up as planned or sooner if any problems.     ---- Message from JULIETTE Steele sent at 8/16/2023  6:42 AM CDT -----  ThinPrep Pap smear negative and HPV negative but transformation zone was insufficient due to partially obscuring blood and inflammation.  I recommend repeat Pap smear in 3 years.

## 2023-08-17 NOTE — PROGRESS NOTES
Per JULIETTE Espinosa, Ms. Lazaro has been called with recent pap Smear & HPV results & recommendations.  Continue current medications and follow-up as planned or sooner if any problems.

## 2023-08-22 ENCOUNTER — PRE-ADMISSION TESTING (OUTPATIENT)
Dept: PREADMISSION TESTING | Facility: HOSPITAL | Age: 43
End: 2023-08-22
Payer: COMMERCIAL

## 2023-08-22 VITALS
RESPIRATION RATE: 20 BRPM | HEART RATE: 71 BPM | SYSTOLIC BLOOD PRESSURE: 146 MMHG | BODY MASS INDEX: 31.34 KG/M2 | WEIGHT: 195 LBS | OXYGEN SATURATION: 96 % | DIASTOLIC BLOOD PRESSURE: 80 MMHG | HEIGHT: 66 IN

## 2023-08-22 LAB — B-HCG UR QL: NEGATIVE

## 2023-08-22 PROCEDURE — 81025 URINE PREGNANCY TEST: CPT

## 2023-08-22 RX ORDER — SODIUM CHLORIDE, SODIUM GLUCONATE, SODIUM ACETATE, POTASSIUM CHLORIDE AND MAGNESIUM CHLORIDE 526; 502; 368; 37; 30 MG/100ML; MG/100ML; MG/100ML; MG/100ML; MG/100ML
1000 INJECTION, SOLUTION INTRAVENOUS CONTINUOUS PRN
Status: CANCELLED | OUTPATIENT
Start: 2023-08-28

## 2023-08-22 RX ORDER — MELOXICAM 15 MG/1
15 TABLET ORAL DAILY
COMMUNITY

## 2023-08-22 NOTE — PAT
Chlorhexidine scrub given with instruction sheet. Instructions reviewed in PAT, understanding verbalized.    Dr. Mak here to review EKG and speak with patient. No orders received, ok to proceed.

## 2023-08-26 ENCOUNTER — ANESTHESIA EVENT (OUTPATIENT)
Dept: PERIOP | Facility: HOSPITAL | Age: 43
End: 2023-08-26
Payer: COMMERCIAL

## 2023-08-28 ENCOUNTER — HOSPITAL ENCOUNTER (OUTPATIENT)
Facility: HOSPITAL | Age: 43
Setting detail: HOSPITAL OUTPATIENT SURGERY
Discharge: HOME | End: 2023-08-28
Attending: ORTHOPAEDIC SURGERY | Admitting: ORTHOPAEDIC SURGERY
Payer: COMMERCIAL

## 2023-08-28 ENCOUNTER — ANESTHESIA (OUTPATIENT)
Dept: PERIOP | Facility: HOSPITAL | Age: 43
End: 2023-08-28
Payer: COMMERCIAL

## 2023-08-28 VITALS
SYSTOLIC BLOOD PRESSURE: 138 MMHG | TEMPERATURE: 97.6 F | RESPIRATION RATE: 20 BRPM | HEART RATE: 77 BPM | DIASTOLIC BLOOD PRESSURE: 80 MMHG | BODY MASS INDEX: 31.29 KG/M2 | OXYGEN SATURATION: 98 % | HEIGHT: 66 IN | WEIGHT: 194.67 LBS
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
## 2023-08-28 DIAGNOSIS — M25.511 ACUTE PAIN OF RIGHT SHOULDER: ICD-10-CM

## 2023-08-28 DIAGNOSIS — S46.011D TRAUMATIC COMPLETE TEAR OF RIGHT ROTATOR CUFF, SUBSEQUENT ENCOUNTER: Primary | ICD-10-CM

## 2023-08-28 DIAGNOSIS — M75.101 ROTATOR CUFF SYNDROME OF RIGHT SHOULDER: ICD-10-CM

## 2023-08-28 PROCEDURE — 25010000002 DEXAMETHASONE PER 1 MG: Performed by: NURSE ANESTHETIST, CERTIFIED REGISTERED

## 2023-08-28 PROCEDURE — C1713 ANCHOR/SCREW BN/BN,TIS/BN: HCPCS | Performed by: ORTHOPAEDIC SURGERY

## 2023-08-28 PROCEDURE — 25010000002 SUGAMMADEX 200 MG/2ML SOLUTION: Performed by: ANESTHESIOLOGY

## 2023-08-28 PROCEDURE — 25010000002 FENTANYL CITRATE (PF) 100 MCG/2ML SOLUTION: Performed by: NURSE ANESTHETIST, CERTIFIED REGISTERED

## 2023-08-28 PROCEDURE — 25010000002 SUCCINYLCHOLINE PER 20 MG: Performed by: NURSE ANESTHETIST, CERTIFIED REGISTERED

## 2023-08-28 PROCEDURE — 25010000002 ONDANSETRON PER 1 MG: Performed by: NURSE ANESTHETIST, CERTIFIED REGISTERED

## 2023-08-28 PROCEDURE — 25010000002 PROPOFOL 200 MG/20ML EMULSION: Performed by: NURSE ANESTHETIST, CERTIFIED REGISTERED

## 2023-08-28 PROCEDURE — 25010000002 CEFAZOLIN PER 500 MG: Performed by: ORTHOPAEDIC SURGERY

## 2023-08-28 PROCEDURE — 25010000002 ROPIVACAINE PER 1 MG: Performed by: ANESTHESIOLOGY

## 2023-08-28 PROCEDURE — 25010000002 EPINEPHRINE 1 MG/ML SOLUTION: Performed by: ORTHOPAEDIC SURGERY

## 2023-08-28 DEVICE — SUT FIBERLINK W/SUT TP 1.3MM WHT/BLU: Type: IMPLANTABLE DEVICE | Site: SHOULDER | Status: FUNCTIONAL

## 2023-08-28 DEVICE — SUT FIBERTAPE FW 2MM 7IN BLU AR72377: Type: IMPLANTABLE DEVICE | Site: SHOULDER | Status: FUNCTIONAL

## 2023-08-28 DEVICE — SUT/ANCH BIOCOMP SWIVELOCK/C DBL 4.75X22MM: Type: IMPLANTABLE DEVICE | Site: SHOULDER | Status: FUNCTIONAL

## 2023-08-28 RX ORDER — DEXAMETHASONE SODIUM PHOSPHATE 4 MG/ML
INJECTION, SOLUTION INTRA-ARTICULAR; INTRALESIONAL; INTRAMUSCULAR; INTRAVENOUS; SOFT TISSUE AS NEEDED
Status: DISCONTINUED | OUTPATIENT
Start: 2023-08-28 | End: 2023-08-28 | Stop reason: SURG

## 2023-08-28 RX ORDER — HYDROCODONE BITARTRATE AND ACETAMINOPHEN 7.5; 325 MG/1; MG/1
1 TABLET ORAL EVERY 8 HOURS PRN
Qty: 12 TABLET | Refills: 0 | Status: SHIPPED | OUTPATIENT
Start: 2023-08-28 | End: 2023-09-07

## 2023-08-28 RX ORDER — ACETAMINOPHEN 325 MG/1
650 TABLET ORAL ONCE AS NEEDED
Status: DISCONTINUED | OUTPATIENT
Start: 2023-08-28 | End: 2023-08-28 | Stop reason: HOSPADM

## 2023-08-28 RX ORDER — SODIUM CHLORIDE, SODIUM GLUCONATE, SODIUM ACETATE, POTASSIUM CHLORIDE AND MAGNESIUM CHLORIDE 526; 502; 368; 37; 30 MG/100ML; MG/100ML; MG/100ML; MG/100ML; MG/100ML
1000 INJECTION, SOLUTION INTRAVENOUS CONTINUOUS PRN
Status: DISCONTINUED | OUTPATIENT
Start: 2023-08-28 | End: 2023-08-28 | Stop reason: HOSPADM

## 2023-08-28 RX ORDER — IPRATROPIUM BROMIDE AND ALBUTEROL SULFATE 2.5; .5 MG/3ML; MG/3ML
3 SOLUTION RESPIRATORY (INHALATION) ONCE
Status: DISCONTINUED | OUTPATIENT
Start: 2023-08-28 | End: 2023-08-28 | Stop reason: HOSPADM

## 2023-08-28 RX ORDER — PROMETHAZINE HYDROCHLORIDE 25 MG/1
25 TABLET ORAL ONCE AS NEEDED
Status: DISCONTINUED | OUTPATIENT
Start: 2023-08-28 | End: 2023-08-28 | Stop reason: HOSPADM

## 2023-08-28 RX ORDER — LIDOCAINE HYDROCHLORIDE 20 MG/ML
INJECTION, SOLUTION EPIDURAL; INFILTRATION; INTRACAUDAL; PERINEURAL AS NEEDED
Status: DISCONTINUED | OUTPATIENT
Start: 2023-08-28 | End: 2023-08-28 | Stop reason: SURG

## 2023-08-28 RX ORDER — PROMETHAZINE HYDROCHLORIDE 25 MG/1
25 SUPPOSITORY RECTAL ONCE AS NEEDED
Status: DISCONTINUED | OUTPATIENT
Start: 2023-08-28 | End: 2023-08-28 | Stop reason: HOSPADM

## 2023-08-28 RX ORDER — ONDANSETRON 2 MG/ML
4 INJECTION INTRAMUSCULAR; INTRAVENOUS ONCE AS NEEDED
Status: DISCONTINUED | OUTPATIENT
Start: 2023-08-28 | End: 2023-08-28 | Stop reason: HOSPADM

## 2023-08-28 RX ORDER — BUPIVACAINE HCL/0.9 % NACL/PF 0.1 %
2 PLASTIC BAG, INJECTION (ML) EPIDURAL ONCE
Status: COMPLETED | OUTPATIENT
Start: 2023-08-28 | End: 2023-08-28

## 2023-08-28 RX ORDER — ONDANSETRON 2 MG/ML
INJECTION INTRAMUSCULAR; INTRAVENOUS AS NEEDED
Status: DISCONTINUED | OUTPATIENT
Start: 2023-08-28 | End: 2023-08-28 | Stop reason: SURG

## 2023-08-28 RX ORDER — TRAMADOL HYDROCHLORIDE 50 MG/1
50 TABLET ORAL EVERY 6 HOURS PRN
Qty: 40 TABLET | Refills: 0 | Status: SHIPPED | OUTPATIENT
Start: 2023-08-28 | End: 2023-09-07

## 2023-08-28 RX ORDER — FENTANYL CITRATE 50 UG/ML
INJECTION, SOLUTION INTRAMUSCULAR; INTRAVENOUS AS NEEDED
Status: DISCONTINUED | OUTPATIENT
Start: 2023-08-28 | End: 2023-08-28 | Stop reason: SURG

## 2023-08-28 RX ORDER — VECURONIUM BROMIDE 1 MG/ML
INJECTION, POWDER, LYOPHILIZED, FOR SOLUTION INTRAVENOUS AS NEEDED
Status: DISCONTINUED | OUTPATIENT
Start: 2023-08-28 | End: 2023-08-28 | Stop reason: SURG

## 2023-08-28 RX ORDER — MEPERIDINE HYDROCHLORIDE 25 MG/ML
12.5 INJECTION INTRAMUSCULAR; INTRAVENOUS; SUBCUTANEOUS
Status: DISCONTINUED | OUTPATIENT
Start: 2023-08-28 | End: 2023-08-28 | Stop reason: HOSPADM

## 2023-08-28 RX ORDER — FLUMAZENIL 0.1 MG/ML
0.2 INJECTION INTRAVENOUS AS NEEDED
Status: DISCONTINUED | OUTPATIENT
Start: 2023-08-28 | End: 2023-08-28 | Stop reason: HOSPADM

## 2023-08-28 RX ORDER — SUCCINYLCHOLINE CHLORIDE 20 MG/ML
INJECTION INTRAMUSCULAR; INTRAVENOUS AS NEEDED
Status: DISCONTINUED | OUTPATIENT
Start: 2023-08-28 | End: 2023-08-28 | Stop reason: SURG

## 2023-08-28 RX ORDER — PROPOFOL 10 MG/ML
INJECTION, EMULSION INTRAVENOUS AS NEEDED
Status: DISCONTINUED | OUTPATIENT
Start: 2023-08-28 | End: 2023-08-28 | Stop reason: SURG

## 2023-08-28 RX ORDER — LIDOCAINE HYDROCHLORIDE 10 MG/ML
INJECTION, SOLUTION EPIDURAL; INFILTRATION; INTRACAUDAL; PERINEURAL
Status: COMPLETED | OUTPATIENT
Start: 2023-08-28 | End: 2023-08-28

## 2023-08-28 RX ORDER — EPHEDRINE SULFATE 50 MG/ML
5 INJECTION, SOLUTION INTRAVENOUS ONCE AS NEEDED
Status: DISCONTINUED | OUTPATIENT
Start: 2023-08-28 | End: 2023-08-28 | Stop reason: HOSPADM

## 2023-08-28 RX ORDER — NALOXONE HCL 0.4 MG/ML
0.4 VIAL (ML) INJECTION AS NEEDED
Status: DISCONTINUED | OUTPATIENT
Start: 2023-08-28 | End: 2023-08-28 | Stop reason: HOSPADM

## 2023-08-28 RX ORDER — DIPHENHYDRAMINE HYDROCHLORIDE 50 MG/ML
12.5 INJECTION INTRAMUSCULAR; INTRAVENOUS
Status: DISCONTINUED | OUTPATIENT
Start: 2023-08-28 | End: 2023-08-28 | Stop reason: HOSPADM

## 2023-08-28 RX ORDER — ROPIVACAINE HYDROCHLORIDE 5 MG/ML
INJECTION, SOLUTION EPIDURAL; INFILTRATION; PERINEURAL
Status: COMPLETED | OUTPATIENT
Start: 2023-08-28 | End: 2023-08-28

## 2023-08-28 RX ORDER — ALBUTEROL SULFATE 90 UG/1
AEROSOL, METERED RESPIRATORY (INHALATION) AS NEEDED
Status: DISCONTINUED | OUTPATIENT
Start: 2023-08-28 | End: 2023-08-28 | Stop reason: SURG

## 2023-08-28 RX ADMIN — VECURONIUM BROMIDE 1 MG: 1 INJECTION, POWDER, LYOPHILIZED, FOR SOLUTION INTRAVENOUS at 15:55

## 2023-08-28 RX ADMIN — ALBUTEROL SULFATE 6 PUFF: 90 AEROSOL, METERED RESPIRATORY (INHALATION) at 16:26

## 2023-08-28 RX ADMIN — ONDANSETRON 4 MG: 2 INJECTION INTRAMUSCULAR; INTRAVENOUS at 14:45

## 2023-08-28 RX ADMIN — Medication 2 G: at 14:20

## 2023-08-28 RX ADMIN — VECURONIUM BROMIDE 0.5 MG: 1 INJECTION, POWDER, LYOPHILIZED, FOR SOLUTION INTRAVENOUS at 14:13

## 2023-08-28 RX ADMIN — PROPOFOL 50 MG: 10 INJECTION, EMULSION INTRAVENOUS at 14:31

## 2023-08-28 RX ADMIN — FENTANYL CITRATE 50 MCG: 50 INJECTION, SOLUTION INTRAMUSCULAR; INTRAVENOUS at 14:13

## 2023-08-28 RX ADMIN — SUGAMMADEX 200 MG: 100 INJECTION, SOLUTION INTRAVENOUS at 16:22

## 2023-08-28 RX ADMIN — LIDOCAINE HYDROCHLORIDE 60 MG: 20 INJECTION, SOLUTION EPIDURAL; INFILTRATION; INTRACAUDAL; PERINEURAL at 14:13

## 2023-08-28 RX ADMIN — VECURONIUM BROMIDE 4.5 MG: 1 INJECTION, POWDER, LYOPHILIZED, FOR SOLUTION INTRAVENOUS at 14:27

## 2023-08-28 RX ADMIN — VECURONIUM BROMIDE 2 MG: 1 INJECTION, POWDER, LYOPHILIZED, FOR SOLUTION INTRAVENOUS at 14:59

## 2023-08-28 RX ADMIN — PROPOFOL 150 MG: 10 INJECTION, EMULSION INTRAVENOUS at 14:13

## 2023-08-28 RX ADMIN — GLYCOPYRROLATE 0.1 MCG: 0.2 INJECTION, SOLUTION INTRAMUSCULAR; INTRAVITREAL at 16:22

## 2023-08-28 RX ADMIN — SUCCINYLCHOLINE CHLORIDE 100 MG: 20 INJECTION, SOLUTION INTRAMUSCULAR; INTRAVENOUS at 14:13

## 2023-08-28 RX ADMIN — SODIUM CHLORIDE, SODIUM GLUCONATE, SODIUM ACETATE, POTASSIUM CHLORIDE AND MAGNESIUM CHLORIDE 1000 ML: 526; 502; 368; 37; 30 INJECTION, SOLUTION INTRAVENOUS at 12:54

## 2023-08-28 RX ADMIN — ROPIVACAINE HYDROCHLORIDE 20 ML: 5 INJECTION, SOLUTION EPIDURAL; INFILTRATION; PERINEURAL at 13:15

## 2023-08-28 RX ADMIN — DEXAMETHASONE SODIUM PHOSPHATE 4 MG: 4 INJECTION, SOLUTION INTRAMUSCULAR; INTRAVENOUS at 14:45

## 2023-08-28 RX ADMIN — LIDOCAINE HYDROCHLORIDE 30 MG: 10 INJECTION, SOLUTION EPIDURAL; INFILTRATION; INTRACAUDAL; PERINEURAL at 13:15

## 2023-08-28 NOTE — ANESTHESIA PROCEDURE NOTES
Peripheral Block      Patient reassessed immediately prior to procedure    Patient location during procedure: pre-op  Start time: 8/28/2023 1:15 PM  Stop time: 8/28/2023 1:25 PM  Reason for block: procedure for pain, at surgeon's request, post-op pain management and secondary anesthetic  Performed by  Anesthesiologist: Hali Mak DO  Preanesthetic Checklist  Completed: patient identified, IV checked, site marked, risks and benefits discussed, surgical consent, monitors and equipment checked, pre-op evaluation and timeout performed  Prep:  Pt Position: supine  Sterile barriers:cap, gloves and sterile barriers  Prep: ChloraPrep  Patient monitoring: blood pressure monitoring, continuous pulse oximetry and EKG  Procedure    Sedation: no  Performed under: PNB  Guidance:ultrasound guided    ULTRASOUND INTERPRETATION.  Using ultrasound guidance a 21 G gauge needle was placed in close proximity to the brachial plexus nerve, at which point, under ultrasound guidance anesthetic was injected in the area of the nerve and spread of the anesthesia was seen on ultrasound in close proximity thereto.  There were no abnormalities seen on ultrasound; a digital image was taken; and the patient tolerated the procedure with no complications. Images:still images obtained, printed/placed on chart    Laterality:right  Block Type:interscalene  Injection Technique:single-shot  Needle Type:echogenic  Needle Gauge:21 G  Resistance on Injection: none    Medications Used: lidocaine PF 1% (XYLOCAINE) injection - Injection   30 mg - 8/28/2023 1:15:00 PM  ropivacaine (NAROPIN) 0.5 % injection - Injection   20 mL - 8/28/2023 1:15:00 PM      Post Assessment  Injection Assessment: negative aspiration for heme, no paresthesia on injection and incremental injection  Patient Tolerance:comfortable throughout block  Complications:no  Additional Notes  Pt & side identified  U/S used throughout and needle seen throughout  No complications  Pt  tolerated procedure well

## 2023-08-28 NOTE — INTERVAL H&P NOTE
H&P reviewed. The patient was examined and there are no changes to the H&P.      Vitals:    23 1246   BP: 140/77   Pulse: 89   Resp: 18   Temp: 96.9 °F (36.1 °C)   SpO2: 96%       Allergies   Allergen Reactions    Other      MRSA HX       Prior to Admission medications    Medication Sig Start Date End Date Taking? Authorizing Provider   buPROPion XL (Wellbutrin XL) 300 MG 24 hr tablet Take 1 tablet by mouth Daily. 8/10/23  Yes Francisca Borden APRN   meloxicam (MOBIC) 15 MG tablet Take 1 tablet by mouth Daily.   Yes Provider, MD Leilani       Past Medical History:   Diagnosis Date    Acute bronchitis     Acute otitis media     Acute sinusitis     nasal congestion       Bacterial conjunctivitis     Cellulitis and abscess of leg     Complete rupture of rotator cuff     Conjunctivitis     Cough     Emphysema lung     Pediculosis capitis     Pregnant state, incidental     Tobacco dependence syndrome     Upper respiratory infection     Wheezing        Past Surgical History:   Procedure Laterality Date     SECTION       SECTION  2016    INJECTION OF MEDICATION  2015    Kenalog (1)          Social History     Socioeconomic History    Marital status:    Tobacco Use    Smoking status: Every Day     Packs/day: 0.50     Types: Cigarettes    Smokeless tobacco: Never   Vaping Use    Vaping Use: Never used   Substance and Sexual Activity    Alcohol use: No    Drug use: Yes     Types: Marijuana    Sexual activity: Defer       History reviewed. No pertinent family history.          This document has been electronically signed by Kilo Hernandez MD on 2023 13:58 CDT

## 2023-08-28 NOTE — ANESTHESIA PREPROCEDURE EVALUATION
Anesthesia Evaluation     Patient summary reviewed and Nursing notes reviewed   no history of anesthetic complications:   NPO Solid Status: > 8 hours  NPO Liquid Status: > 4 hours           Airway   Mallampati: II  TM distance: >3 FB  Neck ROM: full  No difficulty expected  Dental    (+) poor dentition and upper dentures        Pulmonary    (+) a smoker Current, COPD mild,decreased breath sounds  (-) asthma, shortness of breath, recent URI, rhonchi, wheezes, rales, stridor, pulmonary embolism, lung cancer, no home oxygen  Cardiovascular   Exercise tolerance: good (4-7 METS)    ECG reviewed  Rhythm: regular  Rate: normal    (-) pacemaker, past MI, dysrhythmias, angina, MURPHY, murmur, peripheral edema, cardiac stents, CABG, DVT    ROS comment: EKG 4/6/2023:  Normal sinus rhythm  Septal infarct , age undetermined  Abnormal ECG  No previous ECGs available    Neuro/Psych  (+) psychiatric history Anxiety  (-) seizures, TIA, CVA  GI/Hepatic/Renal/Endo    (+) obesity  (-) morbid obesity, GERD, no renal disease, diabetes    Musculoskeletal     Abdominal   (+) obese   Substance History      OB/GYN    (-)  Pregnant        Other   arthritis,     ROS/Med Hx Other: Traumatic complete tear of left rotator cuff    Pt has been told she has emphysema 4/2023. Denies inhaler use or home O2. Also denies any previous admissions for COPD exacerbation.     Pt has only had C-sections. Denies general anesthesia    No previous diagnosis of HTN: PY=252/77 today      Phys Exam Other: Very poor dentition on the bottom.                 Anesthesia Plan    ASA 3     general with block     (Discussed peripheral nerve block (interscalene) for post op pain relief and patient understands possible complications, risks, & agrees.  Hgb=14  Bhcg negative  Duoneb in preop)  intravenous induction     Anesthetic plan, risks, benefits, and alternatives have been provided, discussed and informed consent has been obtained with: patient.  Pre-procedure education  provided  Use of blood products discussed with patient  Consented to blood products.    Plan discussed with CRNA.      CODE STATUS:

## 2023-08-28 NOTE — BRIEF OP NOTE
SHOULDER ARTHROSCOPY  Progress Note    Roma Lazaro  8/28/2023    Pre-op Diagnosis:   Acute pain of right shoulder [M25.511]  Rotator cuff syndrome of right shoulder [M75.101]  Traumatic complete tear of left rotator cuff, subsequent encounter [S46.012D]       Post-Op Diagnosis Codes:     * Acute pain of right shoulder [M25.511]     * Rotator cuff syndrome of right shoulder [M75.101]     * Traumatic complete tear of left rotator cuff, subsequent encounter [S46.012D]    Procedure/CPT® Codes:        Procedure(s):  RIGHT SHOULDER ARTHROSCOPY WITH ROTATOR CUFF REPAIR, CHRISTINA PROCEDURE, AND SUBACROMIAL DECOMPRESSION        Surgeon(s):  Kilo Hernandez MD    Anesthesia: General with Block    Staff:   Circulator: Yolanda Lane RN; Kika Corley RN  Scrub Person: Tara Shipley; Steff Brenner  Vendor Representative: Mirella Arellano  Assistant: Arlet Moran  Assistant: Arlet Moran      Estimated Blood Loss: minimal    Urine Voided: * No values recorded between 8/28/2023  2:05 PM and 8/28/2023  4:42 PM *    Specimens:                None          Drains: * No LDAs found *    Findings: large retracted rotator cuff tear        Complications: none    Assistant: Arlet Moran  was responsible for performing the following activities: Retraction, Suction, Irrigation, Suturing, Closing, and Placing Dressing and their skilled assistance was necessary for the success of this case.    Kilo Hernandez MD     Date: 8/28/2023  Time: 17:12 CDT

## 2023-08-28 NOTE — DISCHARGE INSTRUCTIONS
What to expect after a Nerve Block    Nerve blocks administered to block pain affect many types of nerves, including those nerves that control movement, pain, and normal sensation. Following a nerve block, you may notice some bruising at the site where the block was given. You may experience sensations such as: numbness of the affected area or limb, tingling, heaviness (that is the limb feels heavy to you), weakness or inability to move the affected arm or leg, or a feeling as if your arm or leg has “fallen asleep.”     A nerve block can last from 2 to 36 hours depending on the medications used.  Usually the weakness wears off first followed by the tingling and heaviness. As the block wears off, you may begin to notice pain; however, this sequence of events may occur in any order. Typically, you will be able to move your limb before you will feel it. Once a nerve block begins to wear off, the effects are usually completely gone within 60 minutes.  If you experience continued side effects that you believe are block related for longer than 48 hours, please call your healthcare provider. Please see block-specific instructions below.    Instructions for any block involving the shoulder or arm  If you have had any kind of shoulder/arm block, you will go home with your arm in a sling. Wear the sling until the block has completely worn off. You may be required to wear it for a longer period of time per your surgeon’s recommendations.  If you have had a shoulder/arm block, it is a good idea to sleep on a recliner with pillows under your arm.    You may experience symptoms such as:  Shortness of breath  Hoarseness   Blurry vision  Unequal pupils  Drooping of your face on the same side as the block was performed    These are side effects associated with this kind of block and should go away within 12 hours.    Note: If you have severe or prolonged shortness of breath, please seek medical assistance as soon as possible.      Protection of a “blocked” arm or leg (limb)  After a nerve block, you cannot feel pain, pressure, or extremes of temperature in the affected limb. And because of this, your blocked limb is at more risk for injury. For example, it is possible to burn your limb on an extremely hot surface without feeling it.     When resting, it is important to reposition your limb periodically to avoid prolonged pressure on it. This may require the use of pillows and padding.    While sleeping, you should avoid rolling onto the affected limb or putting too much pressure on it.     If you have a cast or tight dressing, check the color of your fingers or toes of the affected limb. Call your surgeon if they look discolored (that is, dusky, dark colored).    Use caution in cold weather. Cover your limb appropriately to protect it from the cold.      Pain Management:    Your surgeon will give you a prescription for pain medication. Begin taking this before the nerve block wears off. Bear in mind that sometimes the block can wear off in the middle of the night.

## 2023-08-28 NOTE — ANESTHESIA POSTPROCEDURE EVALUATION
Patient: Roma Lazaro    Procedure Summary       Date: 08/28/23 Room / Location: [facility] OR [address] [place] [address] OR    Anesthesia Start: 1406 Anesthesia Stop: 1648    Procedure: RIGHT SHOULDER ARTHROSCOPY WITH ROTATOR CUFF REPAIR, CHRISTINA PROCEDURE, AND SUBACROMIAL DECOMPRESSION (Right: Shoulder) Diagnosis:       Acute pain of right shoulder      Rotator cuff syndrome of right shoulder      Traumatic complete tear of left rotator cuff, subsequent encounter      (Acute pain of right shoulder [M25.511])      (Rotator cuff syndrome of right shoulder [M75.101])      (Traumatic complete tear of left rotator cuff, subsequent encounter [S46.012D])    Surgeons: Kilo Hernandez MD Provider: Hali Mak DO    Anesthesia Type: general with block ASA Status: 3            Anesthesia Type: general with block    Vitals  No vitals data found for the desired time range.          Post Anesthesia Care and Evaluation    Patient location during evaluation: PACU  Patient participation: complete - patient participated  Level of consciousness: awake  Pain score: 0  Pain management: adequate    Airway patency: patent  Anesthetic complications: No anesthetic complications  PONV Status: none  Cardiovascular status: acceptable and hemodynamically stable  Respiratory status: acceptable, nasal cannula and spontaneous ventilation  Hydration status: acceptable    Comments: HR 88  /67  SPO2 92%  TEMP 97.3

## 2023-08-28 NOTE — ANESTHESIA PROCEDURE NOTES
Airway  Urgency: elective    Date/Time: 8/28/2023 2:14 PM  End Time:8/28/2023 2:14 PM  Airway not difficult    General Information and Staff    Patient location during procedure: OR  CRNA/CAA: Suellen Lynch CRNA  SRNA: Kalyn Mc SRNA  Indications and Patient Condition  Indications for airway management: airway protection    Preoxygenated: yes  Mask difficulty assessment: 0 - not attempted    Final Airway Details  Final airway type: endotracheal airway      Successful airway: ETT  Cuffed: yes   Successful intubation technique: direct laryngoscopy  Facilitating devices/methods: intubating stylet  Endotracheal tube insertion site: oral  Blade: Latia  Blade size: 3  ETT size (mm): 7.0  Cormack-Lehane Classification: grade IIa - partial view of glottis  Placement verified by: chest auscultation and capnometry   Cuff volume (mL): 8  Measured from: lips  ETT/EBT  to lips (cm): 22  Number of attempts at approach: 1  Assessment: lips, teeth, and gum same as pre-op and atraumatic intubation

## 2023-08-29 NOTE — OP NOTE
NAME: Roma Lazaro     YOB: 1980    DATE OF SURGERY: 8/28/2023    PREOPERATIVE DIAGNOSIS:  Acute pain of right shoulder [M25.511]  Rotator cuff syndrome of right shoulder [M75.101]  Traumatic complete tear of left rotator cuff, subsequent encounter [S46.012D]    POSTOPERATIVE DIAGNOSIS:  Post-Op Diagnosis Codes:     * Acute pain of right shoulder [M25.511]     * Rotator cuff syndrome of right shoulder [M75.101]     * Traumatic complete tear of left rotator cuff, subsequent encounter [S46.012D]    PROCEDURE PERFORMED:      Procedure(s) (LRB):  RIGHT SHOULDER ARTHROSCOPY WITH ROTATOR CUFF REPAIR, CHRISTINA PROCEDURE, AND SUBACROMIAL DECOMPRESSION (Right)    SURGEON:  Kilo Hernandez MD    Assistant: Arlet Moran was responsible for performing the following activities: Retraction, Suction, Irrigation, Suturing, Closing, and Placing Dressing and their skilled assistance was necessary for the success of this case.     Staff:    Circulator: Yolanda Lane RN; Kika Corley RN  Scrub Person: Tara Shipley; Steff Brenner  Vendor Representative: Mirella Arellano  Assistant: Arlet Moran    Anesthesia: General with Block     Estimated Blood Loss:  minimal               Specimens : None    Complications: none    Implants:    Implant Name Type Inv. Item Serial No.  Lot No. LRB No. Used Action   SUT FIBERLINK W/SUT TP 1.3MM WHT/KULWANT - [id number] Implant SUT FIBERLINK W/SUT TP 1.3MM WHT/KULWANT  [id number] Right 3 Implanted   SUT FIBERTAPE FW 2MM 7IN KULWANT [id number] - [id number] Implant SUT FIBERTAPE FW 2MM 7IN KULWANT [id number]  [employer] [id number] Right 3 Implanted   SUT/ANCH BIOCOMP SWIVELOCK/C DBL [id number] - [id number] Implant SUT/ANCH BIOCOMP SWIVELOCK/C DBL [id number]  [employer] [id number] Right 3 Implanted       DESCRIPTION OF PROCEDURE:   Once consent was obtained, the patient was taken to the operating room. Once adequate anesthesia was obtained, then the patient was rolled in  the lateral decubitus position, Right side up. All bony prominences were well padded, and an axillary roll was placed. The Right upper extremity was then manipulated.  She had good range of motion to about 90 degrees of abduction.  Significant adhesions were then broken to obtain close to full abduction and forward flexion.  She did have good internal and external rotation.  The Right upper extremity was then prepped and draped in the standard surgical fashion. The Right arm was then placed in the arthroscopic arm cary. The standard posterior portal was made, and the diagnostic portion of the arthroscopy began.    The intra-articular portion of the exam showed that the articular surface was in good condition without any sign of articular cartilage wear.. The biceps tendon showed a firm attachment onto the glenoid labrum. The rotator cuff was visualized and found to be torn and retracted. There were no loose bodies in the intra-articular space or in the subacromial recess.      The subacromial space was then entered and there was noted to be a large subacromial spur on the anterior aspect. There also was noted to be significant inflammatory bursitis, which was resected using electrocautery and the suction shaver. Once the acromion and the clavicle were clearly identified, then the josette was used to re-sect the subacromial spur and to turn the acromion into a type 1 acromion. This was done after having established a lateral portal using an 18-gauge spinal needle for localization and a small stab wound incision.      At this point, the anterior portal was also established using an 18-gauge spinal needle for localization and a small stab wound incision. The cannula was inserted and the distal clavicle was noted to have significant arthritic changes and subclavicular spurring.  The distal clavicle resection was performed in the standard fashion resecting approximately 8-10 mm of bone.      The attention was returned to  the rotator cuff which was found to be torn and retracted almost to the level of the glenoid.  The tissue was noted to be somewhat friable and did not have a thick healthy tendinous tissue.  The rotator cuff grasper was utilized to assess the mobility.  The muscle did not seem to have the pliability that would be expected of a 42-year-old.  However, the tissue was able to be pulled over the articular margin.  The cuff was then controlled using a fiber link cinch suture in the anterior aspect of the cuff tear and then a second 1 in the posterior aspect of the tear.  We then placed 3 fiber tape sutures in an inverted mattress formation to gain complete control of the rotator cuff.  This allowed for better mobility of the cuff and for ultimate control.  A final fiber link suture was placed in the far posterior portion of the repair.  The cuff was then repaired using 3 swivel lock suture anchors.  The first was posterior on the tuberosity.  The second 1 was more central along the tuberosity and then the third  anchor was placed just posterior to the biceps tendon.  Each of the anchors were placed in the standard fashion and the sutures were tensioned just prior to sinking of the anchor.  The rotator cuff tissue was able to be pulled across the articular margin.  Adequate repair was felt to be achieved in this fashion.      The shaver was then allowed to run on suction to remove any remaining loose fragments. The arthroscopy was then terminated. The wounds were closed with interrupted nylon suture, covered with Xeroform gauze, 4x4's, and Elastoplast dressing. The patient was then awakened and taken to the recovery room in good condition. The patient tolerated the procedure very well.      Kilo Hernandez MD     Date: 8/29/2023  Time: 12:20 CDT      Addendum to change surgery date.  Electronically signed by Kilo Hernandez MD on 08/29/23 at 17:07 CDT

## 2023-08-30 ENCOUNTER — HOSPITAL ENCOUNTER (OUTPATIENT)
Dept: PHYSICAL THERAPY | Facility: HOSPITAL | Age: 43
Setting detail: THERAPIES SERIES
Discharge: HOME | End: 2023-08-30
Payer: COMMERCIAL

## 2023-08-30 DIAGNOSIS — M25.511 ACUTE PAIN OF RIGHT SHOULDER: ICD-10-CM

## 2023-08-30 DIAGNOSIS — M75.101 ROTATOR CUFF SYNDROME OF RIGHT SHOULDER: Primary | ICD-10-CM

## 2023-08-30 DIAGNOSIS — S46.012D TRAUMATIC COMPLETE TEAR OF LEFT ROTATOR CUFF, SUBSEQUENT ENCOUNTER: ICD-10-CM
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
## 2023-08-30 PROCEDURE — 97163 PT EVAL HIGH COMPLEX 45 MIN: CPT | Performed by: PHYSICAL THERAPIST
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
## 2023-09-01 ENCOUNTER — TELEPHONE (OUTPATIENT)
Dept: ORTHOPEDIC SURGERY | Facility: CLINIC | Age: 43
End: 2023-09-01
Payer: COMMERCIAL

## 2023-09-01 NOTE — LETTER
September 1, 2023     Patient: Roma Lazaro   YOB: 1980   Date of Visit: 9/1/2023       To Whom It May Concern:    It is my medical opinion that Roma Lazaro may return to work on 9/3/23 with restrictions .  NO USE RIGHT ARM. MUST WEAR SLING.  NO LIFTING         Sincerely,          This document has been electronically signed by Arlet Andrade CSA on September 1, 2023 08:35 CDT       Kilo Hernandez MD    CC: No Recipients

## 2023-09-01 NOTE — TELEPHONE ENCOUNTER
DR CAMPBELL.  PATIENT HAD LT SHOULDER SURGERY ON MONDAY, 08/28/2023.  SHE IS ASKING FOR A RETURN TO WORK STATEMENT FOR SUNDAY, 09/03/2023 WITH RESTRICTIONS.

## 2023-09-01 NOTE — LETTER
September 1, 2023     Patient: Roma Lazaro   YOB: 1980   Date of Visit: 9/1/2023       To Whom It May Concern:    It is my medical opinion that Roma Lazaro may return to work on 9/3/23 with restrictions .  NO USE OF THE LEFT ARM.  MUST WEAR SLING.  NO LIFTING       Sincerely,          This document has been electronically signed by Arlet Andrade CSA on September 1, 2023 08:33 CDT       Kilo Hernandez MD    CC: No Recipients

## 2023-09-05 ENCOUNTER — HOSPITAL ENCOUNTER (OUTPATIENT)
Dept: PHYSICAL THERAPY | Facility: HOSPITAL | Age: 43
Setting detail: THERAPIES SERIES
Discharge: HOME | End: 2023-09-05
Payer: COMMERCIAL

## 2023-09-05 DIAGNOSIS — M75.101 ROTATOR CUFF SYNDROME OF RIGHT SHOULDER: Primary | ICD-10-CM

## 2023-09-05 DIAGNOSIS — M25.511 ACUTE PAIN OF RIGHT SHOULDER: ICD-10-CM

## 2023-09-05 PROCEDURE — 97110 THERAPEUTIC EXERCISES: CPT | Performed by: PHYSICAL THERAPIST

## 2023-09-05 PROCEDURE — 97535 SELF CARE MNGMENT TRAINING: CPT | Performed by: PHYSICAL THERAPIST

## 2023-09-05 NOTE — THERAPY TREATMENT NOTE
Outpatient Physical Therapy Ortho Treatment Note  [facility]     Patient Name: Roma Lazaro  : 1980  MRN: [id number]  Today's Date: 2023      Visit Date: 2023    Attendance: 2/2 (20/yr)  Subjective Improvement: n/a  Next MD Appt: 23  Recert Date: 23     Therapy Diagnosis: R massive RTC repair with SAD & Ignacio, DOS: 23        Visit Dx:    ICD-10-CM ICD-9-CM   1. Rotator cuff syndrome of right shoulder  M75.101 726.10   2. Acute pain of right shoulder  M25.511 719.41       Patient Active Problem List   Diagnosis    Pregnant state, incidental    Acute pain of right shoulder    Rotator cuff syndrome of right shoulder    Accidental fall    Traumatic complete tear of left rotator cuff        Past Medical History:   Diagnosis Date    Acute bronchitis     Acute otitis media     Acute sinusitis     nasal congestion       Bacterial conjunctivitis     Cellulitis and abscess of leg     Complete rupture of rotator cuff     Conjunctivitis     Cough     Emphysema lung     Pediculosis capitis     Pregnant state, incidental     Tobacco dependence syndrome     Upper respiratory infection     Wheezing         Past Surgical History:   Procedure Laterality Date     SECTION       SECTION  2016    INJECTION OF MEDICATION  2015    Kenalog (1)           PT Ortho       Row Name 23 1155       Precautions and Contraindications    Precautions R RTC repair with JESSICA/Ignacio 23  -BB    Contraindications PROM x 8 weeks; no AA/AROM until then  -BB       Subjective Pain    Able to rate subjective pain? yes  -BB       Posture/Observations    Posture/Observations Comments sling donned with pillow. patients daughter present.  -BB       Right Upper Ext    Rt Shoulder Abduction PROM 85  -BB    Rt Shoulder Flexion PROM 90  -BB              User Key  (r) = Recorded By, (t) = Taken By, (c) = Cosigned By      Initials Name Provider Type    Krystle Red, PT DPT Physical  Therapist                                 PT Assessment/Plan       Row Name 09/05/23 1153          PT Assessment    Assessment Comments is 1 week 1 day post op. Good tolerance to PROM, used pain as guide for PROM this date. Reported PROM assisted in symptoms. reviewed restrictions and PROM. Discussed arm dangling at side as in pendulums for family photos then return sling immediately for optimal safety with patient and daughter agreeing.  -BB        PT Plan    PT Frequency 1x/week;2x/week  -BB     Predicted Duration of Therapy Intervention (PT) 12-16 weeks  -BB     PT Plan Comments PROM only for 8 weeks  -BB               User Key  (r) = Recorded By, (t) = Taken By, (c) = Cosigned By      Initials Name Provider Type    Krystle Red, PT DPT Physical Therapist                     Modalities       Row Name 09/05/23 7749             Ice    Patient reports will apply ice at home to involved area Yes  ice to go  -BB                User Key  (r) = Recorded By, (t) = Taken By, (c) = Cosigned By      Initials Name Provider Type    Krystle Red, PT DPT Physical Therapist                   OP Exercises       Row Name 09/05/23 1285             Subjective Comments    Subjective Comments Reports will take family pictures and questioned sling off for pictures and reviewed can let hand motionless like with HEP for pendulums.  -BB         Subjective Pain    Able to rate subjective pain? yes  -BB      Pre-Treatment Pain Level 0  medication about 20 minutes ago  -BB      Post-Treatment Pain Level 0  -BB         Exercise 1    Exercise Name 1 Review of PROM and pendulums  -BB         Exercise 2    Exercise Name 2 PROM flexion/abduction  -BB      Time 2 15'15' (30' total time)  -BB         Exercise 3    Exercise Name 3 review of sling positioning and ice to go  -BB                User Key  (r) = Recorded By, (t) = Taken By, (c) = Cosigned By      Initials Name Provider Type    Krystle Red, PT DPT Physical Therapist                                   PT OP Goals       Row Name 09/05/23 1155          PT Short Term Goals    STG Date to Achieve 09/20/23  -BB     STG 1 Note a >/= 25% subjective improvement.  -BB     STG 1 Progress Not Met  -BB     STG 2 QuickDASH score to be </= 60.  -BB     STG 2 Progress Not Met  -BB     STG 3 Passive flexion to be >/= 120 deg.  -BB     STG 3 Progress Not Met  -BB     STG 3 Progress Comments 90  -BB     STG 4 Passive abduction to be >/= 120 deg.  -BB     STG 4 Progress Not Met  -BB     STG 4 Progress Comments 85  -BB     STG 5 Passive ER in scapular plane to be >/= 60 deg.  -BB     STG 5 Progress Not Met  -BB        Long Term Goals    LTG Date to Achieve 12/20/23  further to be determined  -BB     LTG 1 Independent with HEP/self-management.  -BB     LTG 1 Progress Not Met  -BB     LTG 2 QuickDASH score to be </= 20.  -BB     LTG 2 Progress Not Met  -BB     LTG 3 R shoulder AROM WFLs all planes.  -BB     LTG 3 Progress Not Met  -BB     LTG 4 R shoulder strength >/= 4/5 all planes.  -BB     LTG 4 Progress Not Met  -BB     LTG 5 Resume unrestricted work.  -BB     LTG 5 Progress Not Met  -BB     LTG 6 Minimal difficulty completing ADLs and IADLs.  -BB     LTG 6 Progress Not Met  -BB        Time Calculation    PT Goal Re-Cert Due Date 09/20/23  -BB               User Key  (r) = Recorded By, (t) = Taken By, (c) = Cosigned By      Initials Name Provider Type    Krystle Red, PT DPT Physical Therapist                                   Time Calculation:   Start Time: 1155  Stop Time: 1234  Time Calculation (min): 39 min  Therapy Charges for Today       Code Description Service Date Service Provider Modifiers Qty    64440195206 HC PT SELF CARE/MGMT/TRAIN EA 15 MIN 9/5/2023 Krystle Gates, PT DPT GP 1    29416631684 HC PT THER PROC EA 15 MIN 9/5/2023 Krystle Gates, PT DPT GP 2                      Krystle Gates PT DPT  9/5/2023

## 2023-09-07 ENCOUNTER — OFFICE VISIT (OUTPATIENT)
Dept: ORTHOPEDIC SURGERY | Facility: CLINIC | Age: 43
End: 2023-09-07
Payer: COMMERCIAL

## 2023-09-07 VITALS — HEIGHT: 66 IN | BODY MASS INDEX: 31.18 KG/M2 | WEIGHT: 194 LBS

## 2023-09-07 DIAGNOSIS — Z98.890 STATUS POST ROTATOR CUFF REPAIR: Primary | ICD-10-CM

## 2023-09-07 DIAGNOSIS — M75.101 ROTATOR CUFF SYNDROME OF RIGHT SHOULDER: ICD-10-CM

## 2023-09-07 DIAGNOSIS — Z98.890 STATUS POST SUBACROMIAL DECOMPRESSION: ICD-10-CM

## 2023-09-07 DIAGNOSIS — Z98.890 STATUS POST ARTHROSCOPY OF RIGHT SHOULDER: ICD-10-CM

## 2023-09-07 DIAGNOSIS — M25.511 ACUTE PAIN OF RIGHT SHOULDER: ICD-10-CM

## 2023-09-07 DIAGNOSIS — S46.011D TRAUMATIC COMPLETE TEAR OF RIGHT ROTATOR CUFF, SUBSEQUENT ENCOUNTER: ICD-10-CM

## 2023-09-07 NOTE — PROGRESS NOTES
Roma Lazaro is a 42 y.o. female is s/p       Chief Complaint   Patient presents with    Right Shoulder - Post-op    Suture / Staple Removal       HISTORY OF PRESENT ILLNESS: This 42-year-old female patient presents today for a 10-day follow-up status post right shoulder arthroscopy with rotator cuff repair, Ignacio procedure and subacromial decompression.  This patient's procedure was performed by Dr. Hernandez on 8/28/2023.  The patient has no unusual complaints and is progressing well.  The patient reports she has some soreness but overall, her pain has improved.  Patient is wearing the shoulder sling with abductor pillow at today's visit.  Patient reports she is participating in physical therapy weekly at sports medicine.  She is performing pendulum swings and elbow extension and flexion but no active range of motion.  The patient denies numbness and tingling.  Denies fever and chills.  Denies symptoms of a DVT.    08/28/23 (10d) Kilo Hernandez MD   Right Shoulder Arthroscopy With Rotator Cuff Repair, Ignacio Procedure, And Subacromial Decompression - Right          Allergies   Allergen Reactions    Other      MRSA HX         Current Outpatient Medications:     buPROPion XL (Wellbutrin XL) 300 MG 24 hr tablet, Take 1 tablet by mouth Daily., Disp: 90 tablet, Rfl: 3    meloxicam (MOBIC) 15 MG tablet, Take 1 tablet by mouth Daily., Disp: , Rfl:     No fevers or chills.  No nausea or vomiting.      PHYSICAL EXAMINATION:       Roma Lazaro is a 42 y.o. female    Patient is awake and alert, answers questions appropriately and is in no apparent distress.    GAIT:     []  Normal  []  Antalgic    Assistive device: []  None  []  Walker     []  Crutches  []  Cane     []  Wheelchair  []  Stretcher    Right Shoulder Exam     Other   Erythema: absent  Scars: present  Sensation: normal  Pulse: present    Comments:  Surgical incisions healing as expected.  Sutures removed.  Surrounding skin warm, dry and  intact.  No signs of infection.  Minimal swelling.  Good elbow extension and flexion.  Distal pulse palpable.  Good  strength.  Cap refill good.            ASSESSMENT:    Diagnoses and all orders for this visit:    Status post rotator cuff repair    Status post arthroscopy of right shoulder    Status post subacromial decompression    Acute pain of right shoulder    Traumatic complete tear of right rotator cuff, subsequent encounter    Rotator cuff syndrome of right shoulder          PLAN  Sutures removed and Steri-Strips applied.  Surgical incision care and Steri-Strip care education provided.  Recommended not to rip, jerked or pull off the Steri-Strips.  Educated patient she may shower but no emerging shoulder and bathtub, swimming pool or hot tub.  Reviewed signs and symptoms of a DVT and infection with the patient.  Recommended to monitor incisions frequently to assess for infection symptoms.  Advised patient if she has any of the signs or symptoms we discussed, she should call the clinic, if she is unable to reach the clinic, she may call the hospital for the orthopedic surgeon on-call or go to the ER if she is unable to reach either.  May alternate ibuprofen and/or acetaminophen for pain.  May call the clinic for pain medication refill as needed.  Educated the patient she should remove the abductor pillow in 2 weeks and th sling in 4 weeks.  However, advised the patient to continue to wear the shoulder sling strategically after the 4 weeks. Wear in unprotected situations such as large family functions, [employer], grocery store, [employer] and/or large crowds.  Patient verbalized understanding.  Educated patient how to don and doff sling.  Recommended to continue physical therapy.  Progress slowly.  Advised patient she will gradually increase passive range of motion of shoulder and restore active range of motion of the elbow, wrist and hand.  Recommended to modify activity, rest and ice for pain and  swelling.  Educated the patient when lying supine, she should have her elbow positioned towards visible.  No lifting, pushing or pulling with the right upper extremity.  No supporting her body weight with the right upper extremity.  Recommended pendulum swings.  Continue active elbow range of motion.  Progress activity slowly with physical therapy per mims protocol.  Recommended to follow-up in 4 weeks for reevaluation.  Follow-up sooner as needed if symptoms change, worsen or for new complaint.    All questions and concerns are addressed with understanding noted. They are aware and are in agreement to this plan.    Return in about 4 weeks (around 10/5/2023) for Recheck.    JULIETTE Del Valle    This document has been electronically signed by JULIETTE Del Valle on September 7, 2023 12:16 CDT      EMR Dragon/Transciption Disclaimer: Some of this note may be an electronic transcription/translation of spoken language to printed text using the Dragon Dictation System.

## 2023-09-08 ENCOUNTER — APPOINTMENT (OUTPATIENT)
Dept: PHYSICAL THERAPY | Facility: HOSPITAL | Age: 43
End: 2023-09-08
Payer: COMMERCIAL

## 2023-09-12 ENCOUNTER — HOSPITAL ENCOUNTER (OUTPATIENT)
Dept: PHYSICAL THERAPY | Facility: HOSPITAL | Age: 43
Setting detail: THERAPIES SERIES
Discharge: HOME | End: 2023-09-12
Payer: COMMERCIAL

## 2023-09-12 DIAGNOSIS — S46.012D TRAUMATIC COMPLETE TEAR OF LEFT ROTATOR CUFF, SUBSEQUENT ENCOUNTER: ICD-10-CM

## 2023-09-12 DIAGNOSIS — M75.101 ROTATOR CUFF SYNDROME OF RIGHT SHOULDER: Primary | ICD-10-CM

## 2023-09-12 PROCEDURE — 97535 SELF CARE MNGMENT TRAINING: CPT

## 2023-09-12 PROCEDURE — G0283 ELEC STIM OTHER THAN WOUND: HCPCS

## 2023-09-12 PROCEDURE — 97110 THERAPEUTIC EXERCISES: CPT

## 2023-09-12 NOTE — THERAPY TREATMENT NOTE
Outpatient Physical Therapy Ortho Treatment Note  [facility]     Patient Name: Roma Lazaro  : 1980  MRN: [id number]  Today's Date: 2023      Visit Date: 2023    Subjective Improvement 0  Visits 3/3  Visits approved 20  RTMD 10-  Recert Date 2023    Therapy Diagnosis: R massive RTC repair with JESSICA & Christina, DOS: 23     Visit Dx:    ICD-10-CM ICD-9-CM   1. Rotator cuff syndrome of right shoulder  M75.101 726.10   2. Traumatic complete tear of left rotator cuff, subsequent encounter  S46.012D V58.89     840.4       Patient Active Problem List   Diagnosis    Pregnant state, incidental    Acute pain of right shoulder    Rotator cuff syndrome of right shoulder    Accidental fall    Traumatic complete tear of left rotator cuff    Status post arthroscopy of right shoulder        Past Medical History:   Diagnosis Date    Acute bronchitis     Acute otitis media     Acute sinusitis     nasal congestion       Bacterial conjunctivitis     Cellulitis and abscess of leg     Complete rupture of rotator cuff     Conjunctivitis     Cough     Emphysema lung     Pediculosis capitis     Pregnant state, incidental     Tobacco dependence syndrome     Upper respiratory infection     Wheezing         Past Surgical History:   Procedure Laterality Date     SECTION       SECTION  2016    INJECTION OF MEDICATION  2015    Kenalog (1)       SHOULDER ARTHROSCOPY Right 2023    Procedure: RIGHT SHOULDER ARTHROSCOPY WITH ROTATOR CUFF REPAIR, CHRISTINA PROCEDURE, AND SUBACROMIAL DECOMPRESSION;  Surgeon: Kilo Hernandez MD;  Location: [facility];  Service: Orthopedics;  Laterality: Right;        PT Ortho       Row Name 23 1000       Subjective    Subjective Comments Patient reports no pain today but reports that she is not sleeping well..  she has tried sleeping in a recliner but that doesnt seem to help  -CP       Precautions and Contraindications     Precautions R RTC repair with JESSICA/Ignacio 8/28/23  -CP    Contraindications PROM x 8 weeks; no AA/AROM until then  -CP       Subjective Pain    Able to rate subjective pain? yes  -CP    Pre-Treatment Pain Level 0  -CP       Posture/Observations    Posture/Observations Comments sling donned with pillow. patients daughter present.  -CP       Right Upper Ext    Rt Shoulder Abduction PROM 95  -CP    Rt Shoulder Flexion PROM 103  -CP    Rt Shoulder External Rotation AROM --  -CP    Rt Shoulder External Rotation PROM to netural AB to 30 degree  -CP    Rt Shoulder Internal Rotation PROM hand to belly  -CP              User Key  (r) = Recorded By, (t) = Taken By, (c) = Cosigned By      Initials Name Provider Type    CP Marilou Koo, SARAI Physical Therapist Assistant                                 PT Assessment/Plan       Row Name 09/12/23 1044          PT Assessment    Assessment Comments All restriction and MD orders where reviewed to patient.  Patient voiced understanding.  She did have increases in PROM for fl, ab and er.  She is progressing well  -CP        PT Plan    PT Frequency 1x/week;2x/week  -CP     Predicted Duration of Therapy Intervention (PT) 12-14 weeks  -CP     PT Plan Comments Cont with POC.  PROM for 8 weeks  -CP               User Key  (r) = Recorded By, (t) = Taken By, (c) = Cosigned By      Initials Name Provider Type    Marilou Delgadillo PTA Physical Therapist Assistant                     Modalities       Row Name 09/12/23 1000             Ice    Ice Applied Yes  -CP      Location right shoulder with IFC  -CP      PT Ice Rx Minutes 15  -CP      Ice S/P Rx Yes  -CP         ELECTRICAL STIMULATION    Attended/Unattended Unattended  -CP      Stimulation Type IFC  -CP      Location/Electrode Placement/Other right shoulder with ice  -CP      PT E-Stim Unattended Minutes 15  -CP                User Key  (r) = Recorded By, (t) = Taken By, (c) = Cosigned By      Initials Name Provider Type    CP  Marilou Koo, PTA Physical Therapist Assistant                   OP Exercises       Row Name 09/12/23 1120 09/12/23 1000          Subjective    Subjective Comments -- Patient reports no pain today but reports that she is not sleeping well..  she has tried sleeping in a recliner but that doesnt seem to help  -CP        Subjective Pain    Able to rate subjective pain? -- yes  -CP     Pre-Treatment Pain Level -- 0  -CP     Post-Treatment Pain Level -- 0  -CP        Total Minutes    02648 - PT Therapeutic Exercise Minutes 20  -CP --        Exercise 1    Exercise Name 1 -- review of restrictions and MD orders  -CP        Exercise 2    Exercise Name 2 -- supine PROM fl and ext elbow  -CP     Cueing 2 -- Verbal;Tactile  -CP     Reps 2 -- 30  -CP        Exercise 3    Exercise Name 3 -- supine PROM forearm supination and pronation  -CP     Cueing 3 -- Verbal;Tactile  -CP     Reps 3 -- 30  -CP        Exercise 4    Exercise Name 4 -- Supine PROM shoulder fl and ab withslight scap  -CP     Cueing 4 -- Verbal;Tactile  -CP     Reps 4 -- 30 each  -CP        Exercise 5    Exercise Name 5 -- supine PROM IR/ER at 30 degrees of AB  -CP     Cueing 5 -- Verbal;Tactile  -CP     Reps 5 -- 30 each  -CP     Time 5 -- ER to neutral and IR to belly  -CP     Additional Comments -- towel roll under upper arm  -CP               User Key  (r) = Recorded By, (t) = Taken By, (c) = Cosigned By      Initials Name Provider Type    CP Marilou Koo, SARAI Physical Therapist Assistant                                  PT OP Goals       Row Name 09/12/23 1000          PT Short Term Goals    STG Date to Achieve 09/20/23  -CP     STG 1 Note a >/= 25% subjective improvement.  -CP     STG 1 Progress Not Met  -CP     STG 2 QuickDASH score to be </= 60.  -CP     STG 2 Progress Not Met  -CP     STG 3 Passive flexion to be >/= 120 deg.  -CP     STG 3 Progress Not Met  -CP     STG 4 Passive abduction to be >/= 120 deg.  -CP     STG 4 Progress Not Met  -CP      STG 5 Passive ER in scapular plane to be >/= 60 deg.  -CP     STG 5 Progress Not Met  -CP        Long Term Goals    LTG Date to Achieve 12/20/23  further to be determined  -CP     LTG 1 Independent with HEP/self-management.  -CP     LTG 1 Progress Not Met  -CP     LTG 2 QuickDASH score to be </= 20.  -CP     LTG 2 Progress Not Met  -CP     LTG 3 R shoulder AROM WFLs all planes.  -CP     LTG 3 Progress Not Met  -CP     LTG 4 R shoulder strength >/= 4/5 all planes.  -CP     LTG 4 Progress Not Met  -CP     LTG 5 Resume unrestricted work.  -CP     LTG 5 Progress Not Met  -CP     LTG 6 Minimal difficulty completing ADLs and IADLs.  -CP     LTG 6 Progress Not Met  -CP        Time Calculation    PT Goal Re-Cert Due Date 09/20/23  -CP               User Key  (r) = Recorded By, (t) = Taken By, (c) = Cosigned By      Initials Name Provider Type    CP Marilou Koo, SARAI Physical Therapist Assistant                    Therapy Education  38846 - PT Self Care/Mgmt Minutes: 10              Time Calculation:   Start Time: 1020  Stop Time: 1115  Time Calculation (min): 55 min  Total Timed Code Minutes- PT: 30 minute(s)  Timed Charges  86502 - PT Therapeutic Exercise Minutes: 20  19596 - PT Self Care/Mgmt Minutes: 10  Untimed Charges  PT E-Stim Unattended Minutes: 15  PT Ice Rx Minutes: 15  Total Minutes  Timed Charges Total Minutes: 30  Untimed Charges Total Minutes: 30   Total Minutes: 30  Therapy Charges for Today       Code Description Service Date Service Provider Modifiers Qty    30287498023 HC PT THER PROC EA 15 MIN 9/12/2023 Marilou Koo, PTA GP 1    11168317435 HC PT SELF CARE/MGMT/TRAIN EA 15 MIN 9/12/2023 Marilou Koo, PTA GP 1    36639373838 HC PT ELECTRICAL STIM UNATTENDED 9/12/2023 Marilou Koo, PTA  1                      Marilou Koo PTA  9/12/2023

## 2023-09-19 ENCOUNTER — HOSPITAL ENCOUNTER (OUTPATIENT)
Dept: PHYSICAL THERAPY | Facility: HOSPITAL | Age: 43
Setting detail: THERAPIES SERIES
Discharge: HOME | End: 2023-09-19
Payer: COMMERCIAL

## 2023-09-19 DIAGNOSIS — M75.101 ROTATOR CUFF SYNDROME OF RIGHT SHOULDER: Primary | ICD-10-CM

## 2023-09-19 DIAGNOSIS — M25.511 ACUTE PAIN OF RIGHT SHOULDER: ICD-10-CM

## 2023-09-19 DIAGNOSIS — S46.012D TRAUMATIC COMPLETE TEAR OF LEFT ROTATOR CUFF, SUBSEQUENT ENCOUNTER: ICD-10-CM
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
## 2023-09-19 PROCEDURE — 97110 THERAPEUTIC EXERCISES: CPT | Performed by: PHYSICAL THERAPIST

## 2023-09-19 NOTE — THERAPY TREATMENT NOTE
Outpatient Physical Therapy Ortho Treatment Note  [facility]     Patient Name: Roma Lazaro  : 1980  MRN: [id number]  Today's Date: 2023      Visit Date: 2023    Attendance: 4/ (20 visits, expires 10/5/23)  Subjective Improvement: 25%  Next MD Appt: 10/5/23  Recert Date: 23     Therapy Diagnosis: R massive RTC repair with SAD & Christina, DOS: 23     Visit Dx:    ICD-10-CM ICD-9-CM   1. Rotator cuff syndrome of right shoulder  M75.101 726.10   2. Traumatic complete tear of left rotator cuff, subsequent encounter  S46.012D V58.89     840.4   3. Acute pain of right shoulder  M25.511 719.41   4. Accidental fall, subsequent encounter  W19.XXXD [id number]            Past Medical History:   Diagnosis Date    Acute bronchitis     Acute otitis media     Acute sinusitis     nasal congestion       Bacterial conjunctivitis     Cellulitis and abscess of leg     Complete rupture of rotator cuff     Conjunctivitis     Cough     Emphysema lung     Pediculosis capitis     Pregnant state, incidental     Tobacco dependence syndrome     Upper respiratory infection     Wheezing         Past Surgical History:   Procedure Laterality Date     SECTION       SECTION  2016    INJECTION OF MEDICATION  2015    Kenalog (1)       SHOULDER ARTHROSCOPY Right 2023    Procedure: RIGHT SHOULDER ARTHROSCOPY WITH ROTATOR CUFF REPAIR, CHRISTINA PROCEDURE, AND SUBACROMIAL DECOMPRESSION;  Surgeon: Kilo Hernandez MD;  Location: [facility];  Service: Orthopedics;  Laterality: Right;        PT Ortho       Row Name 23 0800       Subjective    Subjective Comments Doing well overall. Aggravated from sling. Trouble sleeping at night. Slept the last 2 nights without her sling but guarded with pillows. Not having a lot of pain. Soreness in the shoulder. 25% subjective improvement.  -SS       Precautions and Contraindications    Precautions R RTC repair with SAD/Christina 23  -SS     Contraindications PROM x 8 weeks; no AA/AROM until then  -       Subjective Pain    Able to rate subjective pain? yes  -SS    Pre-Treatment Pain Level 0  -SS    Post-Treatment Pain Level 0  -SS       Posture/Observations    Posture/Observations Comments Presents wearing sling with abduction.  -SS       Right Upper Ext    Rt Shoulder Abduction PROM 84 deg  -SS    Rt Shoulder Flexion PROM 120 deg  -SS    Rt Shoulder External Rotation PROM 37 deg in supine with shoulder abducted 30 deg  -              User Key  (r) = Recorded By, (t) = Taken By, (c) = Cosigned By      Initials Name Provider Type    Santiago Santamaria, PT, DPT, CHT Physical Therapist                                 PT Assessment/Plan       Row Name 09/19/23 1400          PT Assessment    Functional Limitations Limitation in home management;Limitations in community activities;Limitations in functional capacity and performance;Performance in leisure activities;Performance in self-care ADL;Performance in work activities  -     Impairments Integumentary integrity;Joint mobility;Muscle strength;Pain;Range of motion  -     Assessment Comments Improved passive flexion and ER this date. Passive abduction decreased this date. Tolerated treatment well. Daughter demonstrates ability to perform passive ROM to shoulder.  -     Rehab Potential Good  barrier: friable tissue  -     Patient/caregiver participated in establishment of treatment plan and goals Yes  -     Patient would benefit from skilled therapy intervention Yes  -SS        PT Plan    Predicted Duration of Therapy Intervention (PT) 12-14 weeks  -     PT Plan Comments 1x/week at this time. Will increase to 2x/week as advance to AROM and strengthening phase of recovery. Recheck next.  -               User Key  (r) = Recorded By, (t) = Taken By, (c) = Cosigned By      Initials Name Provider Type    Santiago Santamaria, PT, DPT, CHT Physical Therapist                       OP  Exercises       Row Name 09/19/23 0800             Subjective    Subjective Comments Doing well overall. Aggravated from sling. Trouble sleeping at night. Slept the last 2 nights without her sling but guarded with pillows. Not having a lot of pain. Soreness in the shoulder. 25% subjective improvement.  -         Subjective Pain    Able to rate subjective pain? yes  -SS      Pre-Treatment Pain Level 0  -SS      Post-Treatment Pain Level 0  -         Exercise 1    Exercise Name 1 PROM flexion, abduction, ER in 30 deg  -      Cueing 1 Verbal;Tactile  -      Reps 1 30 each  -SS         Exercise 2    Exercise Name 2 instruction of PROM to family  -         Exercise 3    Exercise Name 3 supine elbow AROM with upper arm supported  -      Cueing 3 Verbal  -SS      Sets 3 1  -SS      Reps 3 30  -SS                User Key  (r) = Recorded By, (t) = Taken By, (c) = Cosigned By      Initials Name Provider Type     Santiago Gordillo, PT, DPT, CHT Physical Therapist                                  PT OP Goals       Row Name 09/19/23 0800       PT Short Term Goals    STG Date to Achieve 09/20/23  -    STG 1 Note a >/= 25% subjective improvement.  -    STG 1 Progress Met  -    STG 2 QuickDASH score to be </= 60.  -    STG 2 Progress Not Met  -    STG 3 Passive flexion to be >/= 120 deg.  -    STG 3 Progress Not Met  -    STG 4 Passive abduction to be >/= 120 deg.  -    STG 4 Progress Not Met  -    STG 5 Passive ER in scapular plane to be >/= 60 deg.  -    STG 5 Progress Not Met  -       Long Term Goals    LTG Date to Achieve 12/20/23  further to be determined  -    LTG 1 Independent with HEP/self-management.  -    LTG 1 Progress Not Met  -    LTG 2 QuickDASH score to be </= 20.  -    LTG 2 Progress Not Met  -    LTG 3 R shoulder AROM WFLs all planes.  -    LTG 3 Progress Not Met  -    LTG 4 R shoulder strength >/= 4/5 all planes.  -    LTG 4 Progress Not Met  -    LTG 5  Resume unrestricted work.  -    LTG 5 Progress Not Met  -    LTG 6 Minimal difficulty completing ADLs and IADLs.  -    LTG 6 Progress Not Met  -       Time Calculation    PT Goal Re-Cert Due Date 09/20/23  -              User Key  (r) = Recorded By, (t) = Taken By, (c) = Cosigned By      Initials Name Provider Type     Santiago Gordillo, PT, DPT, CHT Physical Therapist                    Therapy Education  Education Details: instructed patient's daughter in PROM to shoulder; reinforced protection and sling wear; okay for active elbow ROM  Given: HEP, Symptoms/condition management  How Provided: Verbal, Demonstration  Provided to: Patient  Level of Understanding: Verbalized, Demonstrated              Time Calculation:   Start Time: 0805  Stop Time: 0845  Time Calculation (min): 40 min  Therapy Charges for Today       Code Description Service Date Service Provider Modifiers Qty    82973981715 HC PT THER PROC EA 15 MIN 9/19/2023 Santiago Gordillo, PT, DPT, CHT GP 3                      Santiago Gordillo, NEHA, DPT, CHT  9/19/2023

## 2023-09-26 ENCOUNTER — HOSPITAL ENCOUNTER (OUTPATIENT)
Dept: PHYSICAL THERAPY | Facility: HOSPITAL | Age: 43
Setting detail: THERAPIES SERIES
Discharge: HOME | End: 2023-09-26
Payer: COMMERCIAL

## 2023-09-26 DIAGNOSIS — M75.101 ROTATOR CUFF SYNDROME OF RIGHT SHOULDER: Primary | ICD-10-CM

## 2023-09-26 DIAGNOSIS — S46.012D TRAUMATIC COMPLETE TEAR OF LEFT ROTATOR CUFF, SUBSEQUENT ENCOUNTER: ICD-10-CM

## 2023-09-26 PROCEDURE — 97110 THERAPEUTIC EXERCISES: CPT | Performed by: PHYSICAL THERAPIST

## 2023-09-26 NOTE — THERAPY PROGRESS REPORT/RE-CERT
Outpatient Physical Therapy Ortho Progress Note  [facility]     Patient Name: Roma Lazaro  : 1980  MRN: [id number]  Today's Date: 2023      Visit Date: 2023  Attendance:  (20 visits, expires 10/5/23)  Subjective Improvement: 25%  Next MD Appt: 10/5/23  Recert Date: 10/16/23     Therapy Diagnosis: R massive RTC repair with JESSICA & Christina, DOS: 23     Visit Dx:    ICD-10-CM ICD-9-CM   1. Rotator cuff syndrome of right shoulder  M75.101 726.10   2. Traumatic complete tear of left rotator cuff, subsequent encounter  S46.012D V58.89     840.4       Patient Active Problem List   Diagnosis    Pregnant state, incidental    Acute pain of right shoulder    Rotator cuff syndrome of right shoulder    Accidental fall    Traumatic complete tear of left rotator cuff    Status post arthroscopy of right shoulder        Past Medical History:   Diagnosis Date    Acute bronchitis     Acute otitis media     Acute sinusitis     nasal congestion       Bacterial conjunctivitis     Cellulitis and abscess of leg     Complete rupture of rotator cuff     Conjunctivitis     Cough     Emphysema lung     Pediculosis capitis     Pregnant state, incidental     Tobacco dependence syndrome     Upper respiratory infection     Wheezing         Past Surgical History:   Procedure Laterality Date     SECTION       SECTION  2016    INJECTION OF MEDICATION  2015    Kenalog (1)       SHOULDER ARTHROSCOPY Right 2023    Procedure: RIGHT SHOULDER ARTHROSCOPY WITH ROTATOR CUFF REPAIR, CHRISTINA PROCEDURE, AND SUBACROMIAL DECOMPRESSION;  Surgeon: Kilo Hernandez MD;  Location: [facility];  Service: Orthopedics;  Laterality: Right;        PT Ortho       Row Name 23 0800       Subjective    Subjective Comments Patients daughter present today with reports patient is not following all guidelines of protocol. Eager to progress  -BB       Precautions and Contraindications     Precautions R RTC repair with JESSICA/Ignacio 8/28/23. is 4 weeks 1 day post op  -BB    Contraindications PROM x 8 weeks; no AA/AROM until then  -BB       Subjective Pain    Able to rate subjective pain? yes  -BB    Pre-Treatment Pain Level 0  -BB       Posture/Observations    Posture/Observations Comments present wearing abd sling. Cues to maintain PROM of arm with sling doff and bed mobility.  -BB       Right Upper Ext    Rt Shoulder Abduction PROM 84°  after ROM/manual 90  -BB    Rt Shoulder Flexion PROM 110  -BB    Rt Shoulder Internal Rotation PROM hand on belly  -BB       MMT (Manual Muscle Testing)    General MMT Comments n/a- protocol  -BB              User Key  (r) = Recorded By, (t) = Taken By, (c) = Cosigned By      Initials Name Provider Type    Krystle Red PT DPT Physical Therapist                                 PT Assessment/Plan       Row Name 09/26/23 0800          PT Assessment    Functional Limitations Limitation in home management;Limitations in community activities;Limitations in functional capacity and performance;Performance in leisure activities;Performance in self-care ADL;Performance in work activities  -BB     Impairments Integumentary integrity;Joint mobility;Muscle strength;Pain;Range of motion  -BB     Assessment Comments Decreased Flexion PROM noted this date. Mild ttp of top of cuff with abduction reported and improved with MFR to shoulder musculature this date. Encouraged PROM only and restriction importance with patient agreeing. Pendulums reviewed and performed correctly. Educated daughter and patient on gentle muscle massage to help tissue tightness with no pain allowed. Could continue to benefit from skilled PT to progress with protocol.  -BB     Rehab Potential Good  friable tissue  -BB     Patient/caregiver participated in establishment of treatment plan and goals Yes  -BB     Patient would benefit from skilled therapy intervention Yes  -BB        PT Plan    PT Frequency  1x/week  -BB     Predicted Duration of Therapy Intervention (PT) 12- 14 weeks  -BB     PT Plan Comments 1x week due to limited visits and protocol progression. Reviewed with patient and daughter PROM importance and pendulums. continue with PROM for 8 weeks.  -BB               User Key  (r) = Recorded By, (t) = Taken By, (c) = Cosigned By      Initials Name Provider Type    Krystle Red, PT DPT Physical Therapist                       OP Exercises       Row Name 09/26/23 0800             Subjective    Subjective Comments Patients daughter present today with reports patient is not following all guidelines of protocol. Eager to progress  -BB         Subjective Pain    Able to rate subjective pain? yes  -BB      Pre-Treatment Pain Level 0  -BB      Post-Treatment Pain Level 0  -BB         Exercise 1    Exercise Name 1 review of protocol restrictions and importance of maintaining PROM and proper sling use.  -BB         Exercise 2    Exercise Name 2 PROM abduction with gentle MFR to anterior and posterior auxilla tissue and top of shoulder  -BB      Time 2 32'  -BB         Exercise 3    Exercise Name 3 PROM flexion with MFR to lat  -BB      Time 3 10'  -BB         Exercise 4    Exercise Name 4 pendulum education and patient performed- appropriately  -BB      Additional Comments 2'  -BB                User Key  (r) = Recorded By, (t) = Taken By, (c) = Cosigned By      Initials Name Provider Type    Krystle Red, PT DPT Physical Therapist                                  PT OP Goals       Row Name 09/26/23 0800          PT Short Term Goals    STG Date to Achieve 10/17/23  -BB     STG 1 Note a >/= 25% subjective improvement.  -BB     STG 1 Progress Met  -BB     STG 2 QuickDASH score to be </= 60.  -BB     STG 2 Progress Not Met  -BB     STG 3 Passive flexion to be >/= 120 deg.  -BB     STG 3 Progress Not Met  -BB     STG 4 Passive abduction to be >/= 120 deg.  -BB     STG 4 Progress Not Met  -BB     STG 5 Passive  ER in scapular plane to be >/= 60 deg.  -BB     STG 5 Progress Not Met  -BB        Long Term Goals    LTG Date to Achieve 12/20/23  further to be determined  -BB     LTG 1 Independent with HEP/self-management.  -BB     LTG 1 Progress Not Met  -BB     LTG 2 QuickDASH score to be </= 20.  -BB     LTG 2 Progress Not Met  -BB     LTG 3 R shoulder AROM WFLs all planes.  -BB     LTG 3 Progress Not Met  -BB     LTG 4 R shoulder strength >/= 4/5 all planes.  -BB     LTG 4 Progress Not Met  -BB     LTG 5 Resume unrestricted work.  -BB     LTG 5 Progress Not Met  -BB     LTG 6 Minimal difficulty completing ADLs and IADLs.  -BB     LTG 6 Progress Not Met  -BB        Time Calculation    PT Goal Re-Cert Due Date 10/17/23  -BB               User Key  (r) = Recorded By, (t) = Taken By, (c) = Cosigned By      Initials Name Provider Type    Krystle Red, PT DPT Physical Therapist                                   Time Calculation:   Start Time: 0810 (arrived late)  Stop Time: 0855  Time Calculation (min): 45 min  Therapy Charges for Today       Code Description Service Date Service Provider Modifiers Qty    99078263484 HC PT THER PROC EA 15 MIN 9/26/2023 Krystle Gates, PT DPT GP 3                      Krystle Gates PT DPT  9/26/2023

## 2023-09-28 ENCOUNTER — APPOINTMENT (OUTPATIENT)
Dept: PHYSICAL THERAPY | Facility: HOSPITAL | Age: 43
End: 2023-09-28
Payer: COMMERCIAL

## (undated) DEVICE — TBG ARTHSCP DUALWAVE

## (undated) DEVICE — ST. SHOULDER SUSPENSION KIT: Brand: DEROYAL

## (undated) DEVICE — BLD SHAVER RESECTOR SERR AGGR 5MM 13CM

## (undated) DEVICE — NDL HYPO PRECISIONGLIDE/REG 18G 1IN PNK

## (undated) DEVICE — TBG PUMP ARTHSCP MAIN AR6400 16FT

## (undated) DEVICE — GLV SURG SIGNATURE ESSENTIAL PF LTX SZ7

## (undated) DEVICE — PK SHLDR ARTHSCP 60

## (undated) DEVICE — Device

## (undated) DEVICE — STERILE POLYISOPRENE POWDER-FREE SURGICAL GLOVES WITH EMOLLIENT COATING: Brand: PROTEXIS

## (undated) DEVICE — ABL OPES COOLCUT ASPIR 3MM 90D

## (undated) DEVICE — STCKNT IMPERV 12IN STRL

## (undated) DEVICE — SOL IRR LACT RNG BG 3000ML

## (undated) DEVICE — GOWN,AURORA,NOREINF,RAGLAN,XL,STERILE: Brand: MEDLINE

## (undated) DEVICE — CONN TBG Y 5 IN 1 LF STRL

## (undated) DEVICE — CONN TBG Y 6 IN 1 LF STRL

## (undated) DEVICE — PAD,ABDOMINAL,8"X10",ST,LF: Brand: MEDLINE

## (undated) DEVICE — DRSNG GZ CURAD XEROFORM NONADHS 5X9IN STRL

## (undated) DEVICE — MARKR SKIN W/RULR AND LBL

## (undated) DEVICE — TP NDL SCORPION SUREFIRE SD SLOT

## (undated) DEVICE — SHOULDER SUSPENSION KIT 6 PER BOX

## (undated) DEVICE — SUT ETHLN 3-0 FS118IN 663H

## (undated) DEVICE — PATIENT RETURN ELECTRODE, SINGLE-USE, CONTACT QUALITY MONITORING, ADULT, WITH 9FT CORD, FOR PATIENTS WEIGING OVER 33LBS. (15KG): Brand: MEGADYNE

## (undated) DEVICE — GLV SURG SIGNATURE ESSENTIAL PF LTX SZ8

## (undated) DEVICE — SYR LL 3CC

## (undated) DEVICE — CANN TWST IN 7CM 8.25MM ID

## (undated) DEVICE — SPNG GZ WOVN 4X4IN 12PLY 10/BX STRL